# Patient Record
Sex: MALE | Race: WHITE | NOT HISPANIC OR LATINO | Employment: FULL TIME | URBAN - METROPOLITAN AREA
[De-identification: names, ages, dates, MRNs, and addresses within clinical notes are randomized per-mention and may not be internally consistent; named-entity substitution may affect disease eponyms.]

---

## 2017-03-16 ENCOUNTER — OFFICE VISIT (OUTPATIENT)
Dept: FAMILY MEDICINE | Facility: CLINIC | Age: 47
End: 2017-03-16
Payer: COMMERCIAL

## 2017-03-16 VITALS
SYSTOLIC BLOOD PRESSURE: 112 MMHG | DIASTOLIC BLOOD PRESSURE: 64 MMHG | HEIGHT: 73 IN | TEMPERATURE: 99.4 F | BODY MASS INDEX: 31.38 KG/M2 | WEIGHT: 236.8 LBS | HEART RATE: 86 BPM

## 2017-03-16 DIAGNOSIS — B34.9 VIRAL ILLNESS: ICD-10-CM

## 2017-03-16 DIAGNOSIS — R50.9 FEVER, UNSPECIFIED: Primary | ICD-10-CM

## 2017-03-16 LAB
BASOPHILS # BLD AUTO: 0 10E9/L (ref 0–0.2)
BASOPHILS NFR BLD AUTO: 0.2 %
DIFFERENTIAL METHOD BLD: ABNORMAL
EOSINOPHIL # BLD AUTO: 0 10E9/L (ref 0–0.7)
EOSINOPHIL NFR BLD AUTO: 0.2 %
ERYTHROCYTE [DISTWIDTH] IN BLOOD BY AUTOMATED COUNT: 12.3 % (ref 10–15)
FLUAV+FLUBV AG SPEC QL: NEGATIVE
FLUAV+FLUBV AG SPEC QL: NORMAL
HCT VFR BLD AUTO: 39.5 % (ref 40–53)
HGB BLD-MCNC: 13.4 G/DL (ref 13.3–17.7)
LYMPHOCYTES # BLD AUTO: 0.9 10E9/L (ref 0.8–5.3)
LYMPHOCYTES NFR BLD AUTO: 9.8 %
MCH RBC QN AUTO: 30.7 PG (ref 26.5–33)
MCHC RBC AUTO-ENTMCNC: 33.9 G/DL (ref 31.5–36.5)
MCV RBC AUTO: 91 FL (ref 78–100)
MONOCYTES # BLD AUTO: 1.7 10E9/L (ref 0–1.3)
MONOCYTES NFR BLD AUTO: 18 %
NEUTROPHILS # BLD AUTO: 6.6 10E9/L (ref 1.6–8.3)
NEUTROPHILS NFR BLD AUTO: 71.8 %
PLATELET # BLD AUTO: 237 10E9/L (ref 150–450)
RBC # BLD AUTO: 4.36 10E12/L (ref 4.4–5.9)
SPECIMEN SOURCE: NORMAL
WBC # BLD AUTO: 9.2 10E9/L (ref 4–11)

## 2017-03-16 PROCEDURE — 87804 INFLUENZA ASSAY W/OPTIC: CPT | Performed by: PHYSICIAN ASSISTANT

## 2017-03-16 PROCEDURE — 36415 COLL VENOUS BLD VENIPUNCTURE: CPT | Performed by: PHYSICIAN ASSISTANT

## 2017-03-16 PROCEDURE — 85025 COMPLETE CBC W/AUTO DIFF WBC: CPT | Performed by: PHYSICIAN ASSISTANT

## 2017-03-16 PROCEDURE — 99213 OFFICE O/P EST LOW 20 MIN: CPT | Performed by: PHYSICIAN ASSISTANT

## 2017-03-16 ASSESSMENT — ENCOUNTER SYMPTOMS
EYE PAIN: 0
NERVOUS/ANXIOUS: 0
LOSS OF CONSCIOUSNESS: 0
BLURRED VISION: 0
TINGLING: 0
CONSTIPATION: 0
SHORTNESS OF BREATH: 0
DYSURIA: 0
FOCAL WEAKNESS: 0
HEMOPTYSIS: 0
EYE DISCHARGE: 0
DOUBLE VISION: 0
COUGH: 0
WEIGHT LOSS: 0
ORTHOPNEA: 0
FREQUENCY: 0
HEADACHES: 1
BACK PAIN: 1
HEARTBURN: 0
PALPITATIONS: 0
NAUSEA: 0
DEPRESSION: 0
EYE REDNESS: 0
SPUTUM PRODUCTION: 0
SEIZURES: 0
BLOOD IN STOOL: 0
ABDOMINAL PAIN: 0
MYALGIAS: 1
WHEEZING: 0
FEVER: 1
WEAKNESS: 0
NECK PAIN: 0
SORE THROAT: 0
PHOTOPHOBIA: 0
SENSORY CHANGE: 0
DIAPHORESIS: 0
INSOMNIA: 0
VOMITING: 0
DIZZINESS: 0
CHILLS: 1
HALLUCINATIONS: 0
DIARRHEA: 0

## 2017-03-16 ASSESSMENT — ANXIETY QUESTIONNAIRES
3. WORRYING TOO MUCH ABOUT DIFFERENT THINGS: SEVERAL DAYS
1. FEELING NERVOUS, ANXIOUS, OR ON EDGE: SEVERAL DAYS
7. FEELING AFRAID AS IF SOMETHING AWFUL MIGHT HAPPEN: NOT AT ALL
GAD7 TOTAL SCORE: 4
6. BECOMING EASILY ANNOYED OR IRRITABLE: NOT AT ALL
5. BEING SO RESTLESS THAT IT IS HARD TO SIT STILL: NOT AT ALL
2. NOT BEING ABLE TO STOP OR CONTROL WORRYING: SEVERAL DAYS

## 2017-03-16 ASSESSMENT — LIFESTYLE VARIABLES: SUBSTANCE_ABUSE: 0

## 2017-03-16 ASSESSMENT — PATIENT HEALTH QUESTIONNAIRE - PHQ9: 5. POOR APPETITE OR OVEREATING: SEVERAL DAYS

## 2017-03-16 NOTE — PROGRESS NOTES
HPI     SUBJECTIVE:                                                    Ruiz Son is a 46 year old male who presents to clinic today for nearly 4 days history of body aches, fever, night sweats and fatigue. He has a slight cough.    ENT Symptoms             Symptoms: cc Present Absent Comment   Fever/Chills  x     Fatigue  x     Muscle Aches  x     Eye Irritation   x    Sneezing   x    Nasal Saurabh/Drg   x    Sinus Pressure/Pain   x Headaches   Loss of smell   x    Dental pain   x    Sore Throat   x    Swollen Glands   x    Ear Pain/Fullness   x    Cough  x  Some    Wheeze   x    Chest Pain   x    Shortness of breath   x    Rash       Other         Symptom duration:  Since Sunday    Symptom severity:     Treatments tried:     Contacts:  Co workers     Problem list and histories reviewed & adjusted, as indicated.  Additional history: as documented    Patient Active Problem List   Diagnosis     Essential hypertension     Generalized anxiety disorder     HYPERLIPIDEMIA LDL GOAL <130     Mild major depression (H)     Past Surgical History   Procedure Laterality Date     Surgical history of -   age 15     RIGHT varicocelectomy       Social History   Substance Use Topics     Smoking status: Never Smoker     Smokeless tobacco: Never Used     Alcohol use 0.0 oz/week     0 Standard drinks or equivalent per week      Comment: very little/   1-2 xmonth     Family History   Problem Relation Age of Onset     Hypertension Mother      CANCER Father      esophageal     Hypertension Brother      Prostate Cancer Other      paternal uncle     Cancer - colorectal No family hx of      C.A.D. No family hx of          Current Outpatient Prescriptions   Medication Sig Dispense Refill     testosterone (ANDROGEL 1% PUMP) 12.5 MG/ACT (1%) gel Place 4 pumps (50 mg) onto the skin daily Apply from dispenser to clean, dry, intact skin of the shoulders, upper arms, or abdomen. 1 Month 5     citalopram (CELEXA) 40 MG tablet Take 1 tablet (40 mg)  by mouth daily 90 tablet 3     atenolol (TENORMIN) 25 MG tablet Take 1 tablet (25 mg) by mouth 2 times daily 180 tablet 3     No Known Allergies  Labs reviewed in EPIC    Reviewed and updated as needed this visit by clinical staff  Tobacco  Allergies  Med Hx  Surg Hx  Fam Hx  Soc Hx      Reviewed and updated as needed this visit by Provider       Review of Systems   Constitutional: Positive for chills, fever and malaise/fatigue. Negative for diaphoresis and weight loss.   HENT: Negative for congestion, ear discharge, ear pain, hearing loss, nosebleeds and sore throat.    Eyes: Negative for blurred vision, double vision, photophobia, pain, discharge and redness.   Respiratory: Negative for cough, hemoptysis, sputum production, shortness of breath and wheezing.    Cardiovascular: Negative for chest pain, palpitations, orthopnea and leg swelling.   Gastrointestinal: Negative for abdominal pain, blood in stool, constipation, diarrhea, heartburn, melena, nausea and vomiting.   Genitourinary: Negative.  Negative for dysuria, frequency and urgency.   Musculoskeletal: Positive for back pain and myalgias. Negative for joint pain and neck pain.   Skin: Negative for itching and rash.   Neurological: Positive for headaches. Negative for dizziness, tingling, sensory change, focal weakness, seizures, loss of consciousness and weakness.   Endo/Heme/Allergies: Negative.    Psychiatric/Behavioral: Negative for depression, hallucinations, substance abuse and suicidal ideas. The patient is not nervous/anxious and does not have insomnia.          Physical Exam   Constitutional: He is oriented to person, place, and time and well-developed, well-nourished, and in no distress.   HENT:   Head: Normocephalic and atraumatic.   Right Ear: External ear normal.   Left Ear: External ear normal.   Nose: Nose normal.   Mouth/Throat: Oropharynx is clear and moist.   Eyes: Conjunctivae and EOM are normal. Pupils are equal, round, and reactive  to light. Right eye exhibits no discharge. Left eye exhibits no discharge. No scleral icterus.   Neck: Normal range of motion. Neck supple. No thyromegaly present.   Cardiovascular: Normal rate, regular rhythm, normal heart sounds and intact distal pulses.  Exam reveals no gallop and no friction rub.    No murmur heard.  Pulmonary/Chest: Effort normal and breath sounds normal. No respiratory distress. He has no wheezes. He has no rales. He exhibits no tenderness.   Abdominal: Soft. Bowel sounds are normal. He exhibits no distension and no mass. There is no tenderness. There is no rebound and no guarding.   Musculoskeletal: Normal range of motion. He exhibits no edema or tenderness.   Lymphadenopathy:     He has no cervical adenopathy.   Neurological: He is alert and oriented to person, place, and time. He has normal reflexes. No cranial nerve deficit. He exhibits normal muscle tone. Gait normal. Coordination normal.   Skin: Skin is warm and dry. No rash noted. No erythema.   Psychiatric: Mood, memory, affect and judgment normal.         (R50.9) Fever, unspecified  (primary encounter diagnosis)  Comment:   Plan: Influenza A/B antigen, CBC with platelets and         differential           (B34.9) Viral illness  Comment:   Plan:         Symptomatic measures and follow up if not improving

## 2017-03-16 NOTE — NURSING NOTE
"Chief Complaint   Patient presents with     Sweats     Generalized Body Aches       Initial /64 (BP Location: Right arm, Patient Position: Chair, Cuff Size: Adult Large)  Pulse 86  Temp 99.4  F (37.4  C) (Tympanic)  Ht 6' 1\" (1.854 m)  Wt 236 lb 12.8 oz (107.4 kg)  BMI 31.24 kg/m2 Estimated body mass index is 31.24 kg/(m^2) as calculated from the following:    Height as of this encounter: 6' 1\" (1.854 m).    Weight as of this encounter: 236 lb 12.8 oz (107.4 kg).  Medication Reconciliation: complete    Health Maintenance that is potentially due pending provider review:  NONE    n/a    Nan COTTO CMA    "

## 2017-03-16 NOTE — MR AVS SNAPSHOT
"              After Visit Summary   3/16/2017    Ruiz Son    MRN: [id number]           Patient Information     Date Of Birth          1970        Visit Information        Provider Department      3/16/2017 2:00 PM Lino Vincent PA-C [facility]        Today's Diagnoses     Fever, unspecified    -  1    Viral illness           Follow-ups after your visit        Follow-up notes from your care team     Return if symptoms worsen or fail to improve.      Who to contact     If you have questions or need follow up information about today's clinic visit or your schedule please contact [facility] directly at [phone].  Normal or non-critical lab and imaging results will be communicated to you by [employer], letter or phone within 4 business days after the clinic has received the results. If you do not hear from us within 7 days, please contact the clinic through [employer] or phone. If you have a critical or abnormal lab result, we will notify you by phone as soon as possible.  Submit refill requests through [employer] or call your pharmacy and they will forward the refill request to us. Please allow 3 business days for your refill to be completed.          Additional Information About Your Visit        MyChart Information     [employer] lets you send messages to your doctor, view your test results, renew your prescriptions, schedule appointments and more. To sign up, go to [email or web address] . Click on \"Log in\" on the left side of the screen, which will take you to the Welcome page. Then click on \"Sign up Now\" on the right side of the page.     You will be asked to enter the access code listed below, as well as some personal information. Please follow the directions to create your username and password.     Your access code is: [email or web address]  Expires: 2017  3:02 PM     Your access code will  in 90 days. If you need help or a new code, please call your [place] clinic " "or [phone].        Care EveryWhere ID     This is your Care EveryWhere ID. This could be used by other organizations to access your [place] medical records  [id number]        Your Vitals Were     Pulse Temperature Height BMI (Body Mass Index)          86 99.4  F (37.4  C) (Tympanic) 6' 1\" (1.854 m) 31.24 kg/m2         Blood Pressure from Last 3 Encounters:   03/16/17 112/64   09/21/16 134/84   06/15/16 131/88    Weight from Last 3 Encounters:   03/16/17 236 lb 12.8 oz (107.4 kg)   09/21/16 240 lb 9.6 oz (109.1 kg)   06/15/16 241 lb (109.3 kg)              We Performed the Following     CBC with platelets and differential     Influenza A/B antigen        Primary Care Provider Office Phone # Fax #    Lino Crouch [phone]       [facility] [address] [address]        Thank you!     Thank you for choosing [facility]  for your care. Our goal is always to provide you with excellent care. Hearing back from our patients is one way we can continue to improve our services. Please take a few minutes to complete the written survey that you may receive in the mail after your visit with us. Thank you!             Your Updated Medication List - Protect others around you: Learn how to safely use, store and throw away your medicines at www.disposemymeds.org.          This list is accurate as of: 3/16/17  3:02 PM.  Always use your most recent med list.                   Brand Name Dispense Instructions for use    atenolol 25 MG tablet    TENORMIN    180 tablet    Take 1 tablet (25 mg) by mouth 2 times daily       citalopram 40 MG tablet    celeXA    90 tablet    Take 1 tablet (40 mg) by mouth daily       testosterone 12.5 MG/ACT (1%) gel    ANDROGEL 1% PUMP    1 Month    Place 4 pumps (50 mg) onto the skin daily Apply from dispenser to clean, dry, intact skin of the shoulders, upper arms, or abdomen.         "

## 2017-03-17 ASSESSMENT — PATIENT HEALTH QUESTIONNAIRE - PHQ9: SUM OF ALL RESPONSES TO PHQ QUESTIONS 1-9: 4

## 2017-03-17 ASSESSMENT — ANXIETY QUESTIONNAIRES: GAD7 TOTAL SCORE: 4

## 2017-03-22 ENCOUNTER — TELEPHONE (OUTPATIENT)
Dept: NURSING | Facility: CLINIC | Age: 47
End: 2017-03-22

## 2017-03-22 ENCOUNTER — OFFICE VISIT (OUTPATIENT)
Dept: FAMILY MEDICINE | Facility: CLINIC | Age: 47
End: 2017-03-22
Payer: COMMERCIAL

## 2017-03-22 VITALS
SYSTOLIC BLOOD PRESSURE: 121 MMHG | BODY MASS INDEX: 30.48 KG/M2 | DIASTOLIC BLOOD PRESSURE: 78 MMHG | HEIGHT: 73 IN | WEIGHT: 230 LBS | TEMPERATURE: 98.4 F | HEART RATE: 103 BPM

## 2017-03-22 DIAGNOSIS — B00.1 COLD SORE: ICD-10-CM

## 2017-03-22 DIAGNOSIS — M25.50 MULTIPLE JOINT PAIN: ICD-10-CM

## 2017-03-22 DIAGNOSIS — R50.9 FEVER, UNSPECIFIED: Primary | ICD-10-CM

## 2017-03-22 LAB
ALBUMIN SERPL-MCNC: 2.6 G/DL (ref 3.4–5)
ALP SERPL-CCNC: 177 U/L (ref 40–150)
ALT SERPL W P-5'-P-CCNC: 55 U/L (ref 0–70)
ANION GAP SERPL CALCULATED.3IONS-SCNC: 8 MMOL/L (ref 3–14)
AST SERPL W P-5'-P-CCNC: 21 U/L (ref 0–45)
BILIRUB SERPL-MCNC: 0.6 MG/DL (ref 0.2–1.3)
BUN SERPL-MCNC: 13 MG/DL (ref 7–30)
CALCIUM SERPL-MCNC: 8.9 MG/DL (ref 8.5–10.1)
CHLORIDE SERPL-SCNC: 100 MMOL/L (ref 94–109)
CO2 SERPL-SCNC: 28 MMOL/L (ref 20–32)
CREAT SERPL-MCNC: 1.03 MG/DL (ref 0.66–1.25)
CRP SERPL-MCNC: 211 MG/L (ref 0–8)
ERYTHROCYTE [DISTWIDTH] IN BLOOD BY AUTOMATED COUNT: 13.1 % (ref 10–15)
ERYTHROCYTE [SEDIMENTATION RATE] IN BLOOD BY WESTERGREN METHOD: 75 MM/H (ref 0–15)
GFR SERPL CREATININE-BSD FRML MDRD: 77 ML/MIN/1.7M2
GLUCOSE SERPL-MCNC: 83 MG/DL (ref 70–99)
HCT VFR BLD AUTO: 38.5 % (ref 40–53)
HGB BLD-MCNC: 13.4 G/DL (ref 13.3–17.7)
MCH RBC QN AUTO: 31.8 PG (ref 26.5–33)
MCHC RBC AUTO-ENTMCNC: 34.8 G/DL (ref 31.5–36.5)
MCV RBC AUTO: 91 FL (ref 78–100)
PLATELET # BLD AUTO: 381 10E9/L (ref 150–450)
POTASSIUM SERPL-SCNC: 3.2 MMOL/L (ref 3.4–5.3)
PROT SERPL-MCNC: 7.3 G/DL (ref 6.8–8.8)
RBC # BLD AUTO: 4.22 10E12/L (ref 4.4–5.9)
SODIUM SERPL-SCNC: 136 MMOL/L (ref 133–144)
URATE SERPL-MCNC: 3.2 MG/DL (ref 3.5–7.2)
WBC # BLD AUTO: 8.4 10E9/L (ref 4–11)

## 2017-03-22 PROCEDURE — 85027 COMPLETE CBC AUTOMATED: CPT | Performed by: NURSE PRACTITIONER

## 2017-03-22 PROCEDURE — 80053 COMPREHEN METABOLIC PANEL: CPT | Performed by: NURSE PRACTITIONER

## 2017-03-22 PROCEDURE — 86140 C-REACTIVE PROTEIN: CPT | Performed by: NURSE PRACTITIONER

## 2017-03-22 PROCEDURE — 36415 COLL VENOUS BLD VENIPUNCTURE: CPT | Performed by: NURSE PRACTITIONER

## 2017-03-22 PROCEDURE — 99213 OFFICE O/P EST LOW 20 MIN: CPT | Performed by: NURSE PRACTITIONER

## 2017-03-22 PROCEDURE — 84550 ASSAY OF BLOOD/URIC ACID: CPT | Performed by: NURSE PRACTITIONER

## 2017-03-22 PROCEDURE — 85652 RBC SED RATE AUTOMATED: CPT | Performed by: NURSE PRACTITIONER

## 2017-03-22 PROCEDURE — 86618 LYME DISEASE ANTIBODY: CPT | Performed by: NURSE PRACTITIONER

## 2017-03-22 RX ORDER — VALACYCLOVIR HYDROCHLORIDE 1 G/1
2000 TABLET, FILM COATED ORAL 2 TIMES DAILY
Qty: 4 TABLET | Refills: 0 | Status: SHIPPED | OUTPATIENT
Start: 2017-03-22 | End: 2017-05-02

## 2017-03-22 NOTE — MR AVS SNAPSHOT
After Visit Summary   3/22/2017    Ruiz Son    MRN: [id number]           Patient Information     Date Of Birth          1970        Visit Information        Provider Department      3/22/2017 11:20 AM Addis Vazquez APRN CNP [facility]        Today's Diagnoses     Fever, unspecified    -  1    Multiple joint pain        Cold sore          Care Instructions          Thank you for choosing [facility] ([facility]  You may be receiving a survey in the mail from [facility] regarding your visit today.  Please take a few minutes to complete and return the survey to let us know how we are doing.      If you have questions or concerns, please contact us via [employer] or you can contact your care team at [phone].    Our Clinic hours are:  Monday 6:40 am  to 7:00 pm  Tuesday -Friday 6:40 am to 5:00 pm    The [place] outpatient lab hours are:  Monday - Friday 6:10 am to 4:45 pm  Saturdays 7:00 am to 11:00 am  Appointments are required, call [phone]    If you have clinical questions after hours or would like to schedule an appointment,  call the clinic at [phone].          Follow-ups after your visit        Your next 10 appointments already scheduled     Mar 27, 2017  2:00 PM CDT   Office Visit with Cherelle Masters MD   [facility]    [address] [address] [phone]           Bring a current list of meds and any records pertaining to this visit.  For Physicals, please bring immunization records and any forms needing to be filled out.  Please arrive 10 minutes early to complete paperwork.              Who to contact     If you have questions or need follow up information about today's clinic visit or your schedule please contact [facility] directly at [phone].  Normal or non-critical lab and imaging results will be communicated to you by MyChart, letter or phone within  "4 business days after the clinic has received the results. If you do not hear from us within 7 days, please contact the clinic through [employer] or phone. If you have a critical or abnormal lab result, we will notify you by phone as soon as possible.  Submit refill requests through [employer] or call your pharmacy and they will forward the refill request to us. Please allow 3 business days for your refill to be completed.          Additional Information About Your Visit        [employer] Information     [employer] lets you send messages to your doctor, view your test results, renew your prescriptions, schedule appointments and more. To sign up, go to [email or web address] . Click on \"Log in\" on the left side of the screen, which will take you to the Welcome page. Then click on \"Sign up Now\" on the right side of the page.     You will be asked to enter the access code listed below, as well as some personal information. Please follow the directions to create your username and password.     Your access code is: [email or web address]  Expires: 2017  3:02 PM     Your access code will  in 90 days. If you need help or a new code, please call your [place] clinic or [phone].        Care EveryWhere ID     This is your Care EveryWhere ID. This could be used by other organizations to access your [place] medical records  [id number]        Your Vitals Were     Pulse Temperature Height BMI (Body Mass Index)          103 98.4  F (36.9  C) (Oral) 6' 1\" (1.854 m) 30.34 kg/m2         Blood Pressure from Last 3 Encounters:   17 121/78   17 112/64   16 134/84    Weight from Last 3 Encounters:   17 230 lb (104.3 kg)   17 236 lb 12.8 oz (107.4 kg)   16 240 lb 9.6 oz (109.1 kg)              We Performed the Following     CBC with platelets     Comprehensive metabolic panel     CRP inflammation     Erythrocyte sedimentation rate auto     Lyme Disease [place] with reflex to WB Serum     Uric acid        "   Today's Medication Changes          These changes are accurate as of: 3/22/17 11:59 PM.  If you have any questions, ask your nurse or doctor.               Start taking these medicines.        Dose/Directions    valACYclovir 1000 mg tablet   Commonly known as:  VALTREX   Used for:  Cold sore   Started by:  Addis Vazquez APRN CNP        Dose:  2000 mg   Take 2 tablets (2,000 mg) by mouth 2 times daily   Quantity:  4 tablet   Refills:  0            Where to get your medicines      These medications were sent to [facility] [address] [facility] [address] [facility] [address]  [address] [address]     Phone:  [phone]     valACYclovir 1000 mg tablet                Primary Care Provider Office Phone # Fax #    Lino Crouch [phone]       [facility] [address] [address]        Thank you!     Thank you for choosing [facility]  for your care. Our goal is always to provide you with excellent care. Hearing back from our patients is one way we can continue to improve our services. Please take a few minutes to complete the written survey that you may receive in the mail after your visit with us. Thank you!             Your Updated Medication List - Protect others around you: Learn how to safely use, store and throw away your medicines at www.disposemymeds.org.          This list is accurate as of: 3/22/17 11:59 PM.  Always use your most recent med list.                   Brand Name Dispense Instructions for use    atenolol 25 MG tablet    TENORMIN    180 tablet    Take 1 tablet (25 mg) by mouth 2 times daily       citalopram 40 MG tablet    celeXA    90 tablet    Take 1 tablet (40 mg) by mouth daily       testosterone 12.5 MG/ACT (1%) gel    ANDROGEL 1% PUMP    1 Month    Place 4 pumps (50 mg) onto the skin daily Apply from dispenser to clean, dry, intact skin of the shoulders, upper  arms, or abdomen.       valACYclovir 1000 mg tablet    VALTREX    4 tablet    Take 2 tablets (2,000 mg) by mouth 2 times daily

## 2017-03-22 NOTE — NURSING NOTE
"Chief Complaint   Patient presents with     Viral Syndrome       Initial /78 (BP Location: Right arm, Cuff Size: Adult Large)  Pulse 103  Temp 98.4  F (36.9  C) (Oral)  Ht 6' 1\" (1.854 m)  Wt 230 lb (104.3 kg)  BMI 30.34 kg/m2 Estimated body mass index is 30.34 kg/(m^2) as calculated from the following:    Height as of this encounter: 6' 1\" (1.854 m).    Weight as of this encounter: 230 lb (104.3 kg).  Medication Reconciliation: complete  "

## 2017-03-22 NOTE — TELEPHONE ENCOUNTER
"Call Type: Triage Call    Presenting Problem: Ruiz was diagnosed with flu like virus.  Today,  Ruiz is having chills and slow moving and body aches.   Ruiz has  many  \"cold sores on bottom lips.\"  [facility] Triage/Cold  Sores/disposition is to be seen within 24 hours and Seamus agreed.    Triage Note:  Guideline Title: Cold Sores  Recommended Disposition: See Provider within 24 hours  Original Inclination: Wanted to speak with a nurse  Override Disposition:  Intended Action: Call PCP/HCP  Physician Contacted: No  Severe pain AND unresponsive to 24 hours of home care ?  YES  Eye involvement ? NO  Symptoms limit fluid intake AND signs of dehydration ? NO  Other mouth lesions ? NO  Involvement of tip of nose ? NO  Symptoms limit fluid intake WITHOUT signs of dehydration ? NO  Signs or symptoms of cold sore(s) in an immunocompromised individual OR frail  elderly person ? NO  Any new OR worsening signs and symptoms of soft tissue infection ? NO  Involvement of face ? NO  Physician Instructions:  Care Advice: Try bland liquids - no citrus juices or carbonated beverages.  Avoid spicy, salty, or acidic foods.  Use straw to avoid lip, tongue and gum lesions.  Avoid contact with infants or anyone who has dermatitis or is  immunosuppressed (such as diabetes, HIV/AIDS, renal disease, chemotherapy,  organ transplant, or chronic steroid use).  Do not scratch or rub lesions to decrease the chance of secondary  infection.  INFECTION CONTROL  CAUTIONS  SYMPTOM / CONDITION MANAGEMENT  Apply ice chip to ulceration for 10 to 15 minutes three or four times a day  or suck on ice chip to reduce pain.  Consider application of local drying or soothing agent (e.g., Blistex,  Debrox, Abreva) to the area every two hours while awake or as directed on  the package or by pharmacist.  Avoid close physical contact (e.g., kissing, oral sex) with other people  until blisters heal. Avoid sexual contact until treatment completed.  Practice safe sex at all " times because sometimes the virus can be spread  even when you do not have an active sore.  Total water intake includes drinking water, water in beverages, and water  contained in food. Fluids make up about 80% of the body's total hydration  need. Individual fluid requirement to maintain hydration vary based on  physical activity, environmental factors and illness. Limit fluids that  contain sugar, caffeine, or alcohol. Urine will be very light yellow color  when you drink enough fluids.  Analgesic/Antipyretic Advice - NSAIDs: Consider aspirin, ibuprofen,  naproxen or ketoprofen for pain or fever as directed on label or by  pharmacist/provider. PRECAUTIONS: - You should not take this medicine for  more than 10 days unless recommended by your provider. EXCEPTIONS: - Should  not be used if taking blood thinners or have bleeding problems. - Do not  use if have history of sensitivity/allergy to any of these medications  or history of cardiovascular, ulcer, kidney, liver disease or diabetes  unless approved by provider. - Do not exceed recommended dose or frequency.  Analgesic/Antipyretic Advice - Acetaminophen: Consider acetaminophen as  directed on label or by pharmacist/provider for pain or fever. PRECAUTIONS:  - Use if there is no history of liver disease, alcoholism, or intake of  three or more alcohol drinks per day - Only if approved by provider during  pregnancy or when breastfeeding - Do not exceed recommended dose or  frequency. Do not take more than 3000 milligrams (mg) in 24 hours. Do not  take this medicine for more than 10 days unless recommended by your  provider. - During pregnancy, acetaminophen should not be taken more than 3  consecutive days without telling provider - To make sure you don't take too  much, check other medicines you take to see if they also contain  acetaminophen.  See provider within 8 hours if signs of infection occur (such as increased  tenderness, swelling, yellowish-green  drainage, or temperature of 101.5 F  [38.6 C] or greater OR any temperature in geriatric or immunocompromised  patients [such as diabetes, HIV/AIDS, renal disease, chemotherapy, organ  transplant, or chronic steroid use]).

## 2017-03-22 NOTE — PROGRESS NOTES
"  SUBJECTIVE:                                                    Ruiz Son is a 46 year old male who presents to clinic today for the following health issues:      ENT Symptoms             Symptoms: cc Present Absent Comment   Fever/Chills x x  Hasn't actually taken temp; but has hot and cold spells   Fatigue x x     Muscle Aches x x  Achy joints,  Swollen ankles and right hand. 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  No known tick bites          Problem list and histories reviewed & adjusted, as indicated.  Additional history: as documented      Reviewed and updated as needed this visit by clinical staff       Reviewed and updated as needed this visit by Provider         ROS:  Constitutional, HEENT, cardiovascular, pulmonary, gi and gu systems are negative, except as otherwise noted.    OBJECTIVE:                                                    /78 (BP Location: Right arm, Cuff Size: Adult Large)  Pulse 103  Temp 98.4  F (36.9  C) (Oral)  Ht 6' 1\" (1.854 m)  Wt 230 lb (104.3 kg)  BMI 30.34 kg/m2  Body mass index is 30.34 kg/(m^2).  GENERAL: healthy, alert and no distress  HENT: ear canals and TM's normal, nose and mouth without ulcers or lesions. Multiple cold sores along the bottom lip.  NECK: no adenopathy, no asymmetry, masses, or scars and thyroid normal to palpation  RESP: lungs clear to auscultation - no rales, rhonchi or wheezes  CV: regular " rate and rhythm, normal S1 S2, no S3 or S4, no murmur, click or rub, no peripheral edema and peripheral pulses strong  MS: +1 edema over the back of the right hand, trace edema bilateral ankles. No erythema. ROM normal. No tenderness with palpation of joints.    Diagnostic Test Results:  Results for orders placed or performed in visit on 03/22/17   CBC with platelets   Result Value Ref Range    WBC 8.4 4.0 - 11.0 10e9/L    RBC Count 4.22 (L) 4.4 - 5.9 10e12/L    Hemoglobin 13.4 13.3 - 17.7 g/dL    Hematocrit 38.5 (L) 40.0 - 53.0 %    MCV 91 78 - 100 fl    MCH 31.8 26.5 - 33.0 pg    MCHC 34.8 31.5 - 36.5 g/dL    RDW 13.1 10.0 - 15.0 %    Platelet Count 381 150 - 450 10e9/L   Comprehensive metabolic panel   Result Value Ref Range    Sodium 136 133 - 144 mmol/L    Potassium 3.2 (L) 3.4 - 5.3 mmol/L    Chloride 100 94 - 109 mmol/L    Carbon Dioxide 28 20 - 32 mmol/L    Anion Gap 8 3 - 14 mmol/L    Glucose 83 70 - 99 mg/dL    Urea Nitrogen 13 7 - 30 mg/dL    Creatinine 1.03 0.66 - 1.25 mg/dL    GFR Estimate 77 >60 mL/min/1.7m2    GFR Estimate If Black >90   GFR Calc   >60 mL/min/1.7m2    Calcium 8.9 8.5 - 10.1 mg/dL    Bilirubin Total 0.6 0.2 - 1.3 mg/dL    Albumin 2.6 (L) 3.4 - 5.0 g/dL    Protein Total 7.3 6.8 - 8.8 g/dL    Alkaline Phosphatase 177 (H) 40 - 150 U/L    ALT 55 0 - 70 U/L    AST 21 0 - 45 U/L   CRP inflammation   Result Value Ref Range    CRP Inflammation 211.0 (H) 0.0 - 8.0 mg/L   Erythrocyte sedimentation rate auto   Result Value Ref Range    Sed Rate 75 (H) 0 - 15 mm/h   Uric acid   Result Value Ref Range    Uric Acid 3.2 (L) 3.5 - 7.2 mg/dL   Lyme Disease [place] with reflex to WB Serum   Result Value Ref Range    Lyme Disease Antibodies Serum  0.00 - 0.89     <0.01  Negative, Absence of detectable Borrelia burdorferi antibodies. A negative   result does not exclude the possibility of Borrelia burgdorferi infection. If   early Lyme disease is suspected, a second sample should be collected  and tested   2 to 4 weeks later.          ASSESSMENT/PLAN:                                                        ICD-10-CM    1. Fever, unspecified R50.9 CBC with platelets     Comprehensive metabolic panel     CRP inflammation     Erythrocyte sedimentation rate auto     Uric acid     Lyme Disease [place] with reflex to WB Serum   2. Multiple joint pain M25.50 CBC with platelets     Comprehensive metabolic panel     CRP inflammation     Erythrocyte sedimentation rate auto     Uric acid     Lyme Disease [place] with reflex to WB Serum   3. Cold sore B00.1 valACYclovir (VALTREX) 1000 mg tablet     Labs today normal.  Likely viral syndrome.  Follow up next week if not improving.      The risks, benefits and treatment options of prescribed medications or other treatments have been discussed with the patient. The patient verbalized their understanding and should call or follow up if no improvement or if they develop further problems.    KEILY Taylor [facility]

## 2017-03-22 NOTE — PATIENT INSTRUCTIONS
Thank you for choosing [facility].  You may be receiving a survey in the mail from [employer] regarding your visit today.  Please take a few minutes to complete and return the survey to let us know how we are doing.      If you have questions or concerns, please contact us via [employer] or you can contact your care team at [phone].    Our Clinic hours are:  Monday 6:40 am  to 7:00 pm  Tuesday -Friday 6:40 am to 5:00 pm    The [place] outpatient lab hours are:  Monday - Friday 6:10 am to 4:45 pm  Saturdays 7:00 am to 11:00 am  Appointments are required, call [phone]    If you have clinical questions after hours or would like to schedule an appointment,  call the clinic at [phone].

## 2017-03-23 LAB — B BURGDOR IGG+IGM SER QL: NORMAL (ref 0–0.89)

## 2017-03-27 ENCOUNTER — TELEPHONE (OUTPATIENT)
Dept: FAMILY MEDICINE | Facility: CLINIC | Age: 47
End: 2017-03-27

## 2017-03-27 ENCOUNTER — OFFICE VISIT (OUTPATIENT)
Dept: FAMILY MEDICINE | Facility: CLINIC | Age: 47
End: 2017-03-27
Payer: COMMERCIAL

## 2017-03-27 VITALS
WEIGHT: 231 LBS | HEART RATE: 83 BPM | TEMPERATURE: 97.3 F | BODY MASS INDEX: 30.62 KG/M2 | HEIGHT: 73 IN | DIASTOLIC BLOOD PRESSURE: 65 MMHG | SYSTOLIC BLOOD PRESSURE: 110 MMHG

## 2017-03-27 DIAGNOSIS — M25.50 PAIN IN JOINT, MULTIPLE SITES: Primary | ICD-10-CM

## 2017-03-27 LAB
CRP SERPL-MCNC: 153 MG/L (ref 0–8)
ERYTHROCYTE [SEDIMENTATION RATE] IN BLOOD BY WESTERGREN METHOD: 69 MM/H (ref 0–15)
HETEROPH AB SER QL: NEGATIVE

## 2017-03-27 PROCEDURE — 86140 C-REACTIVE PROTEIN: CPT | Performed by: FAMILY MEDICINE

## 2017-03-27 PROCEDURE — 85652 RBC SED RATE AUTOMATED: CPT | Performed by: FAMILY MEDICINE

## 2017-03-27 PROCEDURE — 36415 COLL VENOUS BLD VENIPUNCTURE: CPT | Performed by: FAMILY MEDICINE

## 2017-03-27 PROCEDURE — 99213 OFFICE O/P EST LOW 20 MIN: CPT | Performed by: FAMILY MEDICINE

## 2017-03-27 PROCEDURE — 86308 HETEROPHILE ANTIBODY SCREEN: CPT | Performed by: FAMILY MEDICINE

## 2017-03-27 PROCEDURE — 86431 RHEUMATOID FACTOR QUANT: CPT | Performed by: FAMILY MEDICINE

## 2017-03-27 RX ORDER — MELOXICAM 7.5 MG/1
7.5 TABLET ORAL DAILY
Qty: 30 TABLET | Refills: 1 | Status: SHIPPED | OUTPATIENT
Start: 2017-03-27 | End: 2017-05-02

## 2017-03-27 NOTE — PATIENT INSTRUCTIONS
Thank you for choosing [facility].  You may be receiving a survey in the mail from [employer] regarding your visit today.  Please take a few minutes to complete and return the survey to let us know how we are doing.      If you have questions or concerns, please contact us via [employer] or you can contact your care team at [phone].    Our Clinic hours are:  Monday 6:40 am  to 7:00 pm  Tuesday -Friday 6:40 am to 5:00 pm    The [place] outpatient lab hours are:  Monday - Friday 6:10 am to 4:45 pm  Saturdays 7:00 am to 11:00 am  Appointments are required, call [phone]    If you have clinical questions after hours or would like to schedule an appointment,  call the clinic at [phone].

## 2017-03-27 NOTE — TELEPHONE ENCOUNTER
Reason for Call:  Other med question    Detailed comments: wife is calling asking if patient should hold of on taking the Mobic until after Rheumatology appointment tomorrow.    Phone Number Patient can be reached at: Other phone number:  [phone]    Best Time: any    Can we leave a detailed message on this number? YES    Call taken on 3/27/2017 at 4:01 PM by Donna Connor

## 2017-03-27 NOTE — MR AVS SNAPSHOT
After Visit Summary   3/27/2017    Ruiz Son    MRN: [id number]           Patient Information     Date Of Birth          1970        Visit Information        Provider Department      3/27/2017 2:00 PM Cherelle Masters MD [facility]        Today's Diagnoses     Pain in joint, multiple sites    -  1      Care Instructions          Thank you for choosing [facility].  You may be receiving a survey in the mail from [facility] regarding your visit today.  Please take a few minutes to complete and return the survey to let us know how we are doing.      If you have questions or concerns, please contact us via [employer] or you can contact your care team at [phone].    Our Clinic hours are:  Monday 6:40 am  to 7:00 pm  Tuesday -Friday 6:40 am to 5:00 pm    The [place] outpatient lab hours are:  Monday - Friday 6:10 am to 4:45 pm  Saturdays 7:00 am to 11:00 am  Appointments are required, call [phone]    If you have clinical questions after hours or would like to schedule an appointment,  call the clinic at [phone].        Follow-ups after your visit        Additional Services     RHEUMATOLOGY REFERRAL       Your provider has referred you to: [facility]: [facility] [phone]   [email or web address]    Please be aware that coverage of these services is subject to the terms and limitations of your health insurance plan.  Call member services at your health plan with any benefit or coverage questions.      Please bring the following with you to your appointment:    (1) Any X-Rays, CTs or MRIs which have been performed.  Contact the facility where they were done to arrange for  prior to your scheduled appointment.    (2) List of current medications   (3) This referral request   (4) Any documents/labs given to you for this referral                  Who to contact     If you have questions or need follow up  "information about today's clinic visit or your schedule please contact [facility] directly at [phone].  Normal or non-critical lab and imaging results will be communicated to you by panOpenhart, letter or phone within 4 business days after the clinic has received the results. If you do not hear from us within 7 days, please contact the clinic through panOpenhart or phone. If you have a critical or abnormal lab result, we will notify you by phone as soon as possible.  Submit refill requests through [employer] or call your pharmacy and they will forward the refill request to us. Please allow 3 business days for your refill to be completed.          Additional Information About Your Visit        panOpenhart Information     Phrazit lets you send messages to your doctor, view your test results, renew your prescriptions, schedule appointments and more. To sign up, go to [email or web address] . Click on \"Log in\" on the left side of the screen, which will take you to the Welcome page. Then click on \"Sign up Now\" on the right side of the page.     You will be asked to enter the access code listed below, as well as some personal information. Please follow the directions to create your username and password.     Your access code is: [email or web address]  Expires: 2017  3:02 PM     Your access code will  in 90 days. If you need help or a new code, please call your [place] clinic or [phone].        Care EveryWhere ID     This is your Care EveryWhere ID. This could be used by other organizations to access your [place] medical records  [id number]        Your Vitals Were     Pulse Temperature Height BMI (Body Mass Index)          83 97.3  F (36.3  C) (Tympanic) 6' 1\" (1.854 m) 30.48 kg/m2         Blood Pressure from Last 3 Encounters:   17 110/65   17 121/78   17 112/64    Weight from Last 3 Encounters:   17 231 lb (104.8 kg)   17 230 lb (104.3 kg)   17 236 lb 12.8 oz (107.4 kg) "              We Performed the Following     CRP, inflammation     ESR: Erythrocyte sedimentation rate     Mononucleosis screen     Rheumatoid factor     RHEUMATOLOGY REFERRAL          Today's Medication Changes          These changes are accurate as of: 3/27/17  2:33 PM.  If you have any questions, ask your nurse or doctor.               Start taking these medicines.        Dose/Directions    meloxicam 7.5 MG tablet   Commonly known as:  MOBIC   Used for:  Pain in joint, multiple sites   Started by:  Cherelle Masters MD        Dose:  7.5 mg   Take 1 tablet (7.5 mg) by mouth daily   Quantity:  30 tablet   Refills:  1            Where to get your medicines      These medications were sent to [facility] [id number] [place] [address] - [address] AT [facility] [address]  [address] [facility] [address]     Phone:  [phone]     meloxicam 7.5 MG tablet                Primary Care Provider Office Phone # Fax #    Lino Crouch [phone]       [facility] [address] [facility] [address]        Thank you!     Thank you for choosing [facility]  for your care. Our goal is always to provide you with excellent care. Hearing back from our patients is one way we can continue to improve our services. Please take a few minutes to complete the written survey that you may receive in the mail after your visit with us. Thank you!             Your Updated Medication List - Protect others around you: Learn how to safely use, store and throw away your medicines at www.disposemymeds.org.          This list is accurate as of: 3/27/17  2:33 PM.  Always use your most recent med list.                   Brand Name Dispense Instructions for use    atenolol 25 MG tablet    TENORMIN    180 tablet    Take 1 tablet (25 mg) by mouth 2 times daily       citalopram 40 MG tablet    celeXA    90 tablet    Take 1 tablet (40 mg) by mouth daily       meloxicam  7.5 MG tablet    MOBIC    30 tablet    Take 1 tablet (7.5 mg) by mouth daily       testosterone 12.5 MG/ACT (1%) gel    ANDROGEL 1% PUMP    1 Month    Place 4 pumps (50 mg) onto the skin daily Apply from dispenser to clean, dry, intact skin of the shoulders, upper arms, or abdomen.       valACYclovir 1000 mg tablet    VALTREX    4 tablet    Take 2 tablets (2,000 mg) by mouth 2 times daily

## 2017-03-27 NOTE — TELEPHONE ENCOUNTER
Patient reported that he did take Advil this evening.  Advised patient to hold off on starting the mobic until he discusses the medications with the rheumatologist.  Spouse and wife agree with the plan.    Loree DONALDSON RN

## 2017-03-28 ENCOUNTER — OFFICE VISIT (OUTPATIENT)
Dept: RHEUMATOLOGY | Facility: CLINIC | Age: 47
End: 2017-03-28
Payer: COMMERCIAL

## 2017-03-28 ENCOUNTER — RADIANT APPOINTMENT (OUTPATIENT)
Dept: GENERAL RADIOLOGY | Facility: CLINIC | Age: 47
End: 2017-03-28
Attending: INTERNAL MEDICINE
Payer: COMMERCIAL

## 2017-03-28 VITALS
HEART RATE: 89 BPM | DIASTOLIC BLOOD PRESSURE: 67 MMHG | SYSTOLIC BLOOD PRESSURE: 112 MMHG | BODY MASS INDEX: 31.14 KG/M2 | WEIGHT: 236 LBS | TEMPERATURE: 98.9 F

## 2017-03-28 DIAGNOSIS — M25.50 PAIN IN JOINT, MULTIPLE SITES: ICD-10-CM

## 2017-03-28 DIAGNOSIS — M25.50 PAIN IN JOINT, MULTIPLE SITES: Primary | ICD-10-CM

## 2017-03-28 LAB
CK SERPL-CCNC: 43 U/L (ref 30–300)
RHEUMATOID FACT SER NEPH-ACNC: <20 IU/ML (ref 0–20)

## 2017-03-28 PROCEDURE — 82164 ANGIOTENSIN I ENZYME TEST: CPT | Mod: 90 | Performed by: INTERNAL MEDICINE

## 2017-03-28 PROCEDURE — 84165 PROTEIN E-PHORESIS SERUM: CPT | Performed by: INTERNAL MEDICINE

## 2017-03-28 PROCEDURE — 86038 ANTINUCLEAR ANTIBODIES: CPT | Performed by: INTERNAL MEDICINE

## 2017-03-28 PROCEDURE — 99244 OFF/OP CNSLTJ NEW/EST MOD 40: CPT | Performed by: INTERNAL MEDICINE

## 2017-03-28 PROCEDURE — 00000402 ZZHCL STATISTIC TOTAL PROTEIN: Performed by: INTERNAL MEDICINE

## 2017-03-28 PROCEDURE — 36415 COLL VENOUS BLD VENIPUNCTURE: CPT | Performed by: INTERNAL MEDICINE

## 2017-03-28 PROCEDURE — 86200 CCP ANTIBODY: CPT | Performed by: INTERNAL MEDICINE

## 2017-03-28 PROCEDURE — 99000 SPECIMEN HANDLING OFFICE-LAB: CPT | Performed by: INTERNAL MEDICINE

## 2017-03-28 PROCEDURE — 82550 ASSAY OF CK (CPK): CPT | Performed by: INTERNAL MEDICINE

## 2017-03-28 PROCEDURE — 71020 XR CHEST 2 VW: CPT

## 2017-03-28 RX ORDER — PREDNISONE 10 MG/1
TABLET ORAL
Qty: 70 TABLET | Refills: 1 | Status: SHIPPED | OUTPATIENT
Start: 2017-03-28 | End: 2017-05-02

## 2017-03-28 NOTE — NURSING NOTE
"Chief Complaint   Patient presents with     Consult     Dr. Rowan   Joint pain all over and started like flu symptoms. Swelling and chills.        Initial /67  Pulse 89  Temp 98.9  F (37.2  C)  Wt 107 kg (236 lb)  BMI 31.14 kg/m2 Estimated body mass index is 31.14 kg/(m^2) as calculated from the following:    Height as of 3/27/17: 1.854 m (6' 1\").    Weight as of this encounter: 107 kg (236 lb).      Patient prefers to be contacted via at Home.   Okay to leave detailed message: Yes  Patient uses MyChart: Yes    Danitza LEE LPN    "

## 2017-03-29 LAB
ALBUMIN SERPL ELPH-MCNC: 2.7 G/DL (ref 3.7–5.1)
ALPHA1 GLOB SERPL ELPH-MCNC: 0.7 G/DL (ref 0.2–0.4)
ALPHA2 GLOB SERPL ELPH-MCNC: 1.2 G/DL (ref 0.5–0.9)
ANA SER QL IA: NORMAL
B-GLOBULIN SERPL ELPH-MCNC: 0.8 G/DL (ref 0.6–1)
CCP AB SER IA-ACNC: 1 U/ML
GAMMA GLOB SERPL ELPH-MCNC: 0.8 G/DL (ref 0.7–1.6)
M PROTEIN SERPL ELPH-MCNC: 0 G/DL
PROT PATTERN SERPL ELPH-IMP: ABNORMAL

## 2017-03-29 NOTE — PROGRESS NOTES
REFERRING PHYSICIAN:  Dr. Cherelle Masters.       CHIEF COMPLAINT:  Joint pain.       HISTORY:  Mr. Ruiz Son is a 46-year-old male whom I am asked to reconsult for on joint pain.  This apparently started basically overnight approximately 2 weeks ago.  He started developing night sweats, fever and then suddenly had swelling involving his ankles and then his hands and elbows.  Because of this, he was seen and evaluated on 03/22/2017 and found to have a uric acid level of 3.2, Lyme negative, sed rate of 75, CRP of 211.  Was seen again yesterday, sed rate now 69, CRP of 153, rheumatoid factor is negative.        He has noticed some discoloration by his ankles, but there has not been any other type of rash.  He does not have psoriasis.  He has not had acute or chronic diarrhea, nor does he carry the diagnosis of inflammatory bowel disease.  He has not noticed any unexplained lymphadenopathy.  There is no stomatitis.  He is not having any dysuria or hematuria.  He denies any progressive dyspnea or acute or chronic cough, hemoptysis, etc.  He was given a prescription for Meloxicam yesterday, which he has not taken.  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        PAST MEDICAL HISTORY:  Hyperlipidemia, hypertension, depression.       MEDICATIONS:    1.  Valtrex.   2.  Testosterone gel.    3.  Citalopram 40 mg daily.    4.  Tenormin 25 mg b.i.d.       DRUG ALLERGIES:  None.        SOCIAL HISTORY:  Is not a smoker, drinks occasionally.       FAMILY HISTORY:  There is nobody in his family with rheumatoid arthritis, lupus, etc.       REVIEW OF SYSTEMS:  A 10-point review of systems is otherwise negative.       PHYSICAL EXAMINATION:     VITAL SIGNS:  Blood pressure 112/67, pulse 89, temperature 98.9, weight 236.     HEENT:  He is normocephalic and atraumatic.  Sclerae are clear and moist.  Oropharynx without lesions.    NECK:  Supple without lymphadenopathy.  There is no palpable enlargement  of the salivary glands or thyroid.     LUNGS:  Clear to auscultation bilaterally.    HEART:  Regular rate and rhythm without murmur.    JOINT EXAM:  There is erythema of warmth, tenderness and perhaps some mild to moderate synovitis of both wrists.  This is also true of the bilateral second, third MCP joints.  There is fullness of the right elbow.  Flexion extension of the elbows was normal.  He has pain with range of motion of both wrists, although flexion extension appears to be fairly close to normal.  No pain or fusion, synovitis of the shoulders.  Range of motion of the hips is normal.  There is no synovitis or fusion of the knees.  There is peripheral edema present in the ankles, there is tenderness diffusely about the ankles, difficult to assess if there is truly synovitis or not.  There is also tenderness of the bilateral 2, 3 MTP joints.    SKIN:  There is some hyperemia present on the medial aspects of both ankles, but no other rashes are noted.    STRENGTH:  Diffuse weakness, but otherwise is intact.       IMPRESSION:  Inflammatory arthritis, etiologies conclude a reactive arthritis versus an JEMIMA associated disorder versus early rheumatoid arthritis versus sarcoid.        RECOMMENDATIONS:    1.  Chest a chest x-ray to rule out sarcoid.     2.  Check an JEMIMA and if positive, check an TITO panel, double-stranded DNA.  Also check a CCP antibody, angiotensin converting enzyme level, SPEP.     3.  A course of prednisone 40 mg daily for 1 week and let me know if not improved by 1 week.  Otherwise if improved, taper by 10 mg weekly.  Further treatment will develop on result of the labs.         DANIELLE GARCÍA MD             D: 2017 14:24   T: 2017 07:38   MT: [id number]      Name:     LEILA OQUENDO   MRN:      [id number]        Account:      [id number]   :      1970           Visit Date:   2017      Document: [id number]       cc: Cherelle Masters MD

## 2017-03-30 ENCOUNTER — MYC MEDICAL ADVICE (OUTPATIENT)
Dept: RHEUMATOLOGY | Facility: CLINIC | Age: 47
End: 2017-03-30

## 2017-03-30 LAB — ACE SERPL-CCNC: 19 U/L

## 2017-05-02 ENCOUNTER — OFFICE VISIT (OUTPATIENT)
Dept: FAMILY MEDICINE | Facility: CLINIC | Age: 47
End: 2017-05-02
Payer: COMMERCIAL

## 2017-05-02 VITALS
TEMPERATURE: 98.2 F | HEART RATE: 78 BPM | WEIGHT: 226 LBS | DIASTOLIC BLOOD PRESSURE: 79 MMHG | HEIGHT: 73 IN | BODY MASS INDEX: 29.95 KG/M2 | SYSTOLIC BLOOD PRESSURE: 126 MMHG

## 2017-05-02 DIAGNOSIS — F41.9 ANXIETY: Primary | ICD-10-CM

## 2017-05-02 PROCEDURE — 99213 OFFICE O/P EST LOW 20 MIN: CPT | Performed by: PHYSICIAN ASSISTANT

## 2017-05-02 ASSESSMENT — ENCOUNTER SYMPTOMS
WEAKNESS: 0
CONSTIPATION: 0
EYE PAIN: 0
DIARRHEA: 0
BLURRED VISION: 0
NERVOUS/ANXIOUS: 1
HEADACHES: 0
PHOTOPHOBIA: 0
DYSURIA: 0
NECK PAIN: 0
SPUTUM PRODUCTION: 0
HALLUCINATIONS: 0
SORE THROAT: 0
FOCAL WEAKNESS: 0
DIZZINESS: 0
PALPITATIONS: 0
LOSS OF CONSCIOUSNESS: 0
DIAPHORESIS: 0
EYE REDNESS: 0
DEPRESSION: 1
ORTHOPNEA: 0
BLOOD IN STOOL: 0
VOMITING: 0
NEUROLOGICAL NEGATIVE: 1
HEMOPTYSIS: 0
HEARTBURN: 0
INSOMNIA: 0
FREQUENCY: 0
ABDOMINAL PAIN: 0
WHEEZING: 0
COUGH: 0
EYE DISCHARGE: 0
NAUSEA: 0
BACK PAIN: 0
WEIGHT LOSS: 0
TINGLING: 0
DOUBLE VISION: 0
MYALGIAS: 0
SENSORY CHANGE: 0
FEVER: 0
SHORTNESS OF BREATH: 0
SEIZURES: 0

## 2017-05-02 ASSESSMENT — ANXIETY QUESTIONNAIRES
1. FEELING NERVOUS, ANXIOUS, OR ON EDGE: MORE THAN HALF THE DAYS
GAD7 TOTAL SCORE: 10
2. NOT BEING ABLE TO STOP OR CONTROL WORRYING: MORE THAN HALF THE DAYS
7. FEELING AFRAID AS IF SOMETHING AWFUL MIGHT HAPPEN: SEVERAL DAYS
5. BEING SO RESTLESS THAT IT IS HARD TO SIT STILL: SEVERAL DAYS
3. WORRYING TOO MUCH ABOUT DIFFERENT THINGS: MORE THAN HALF THE DAYS
6. BECOMING EASILY ANNOYED OR IRRITABLE: SEVERAL DAYS

## 2017-05-02 ASSESSMENT — PATIENT HEALTH QUESTIONNAIRE - PHQ9: 5. POOR APPETITE OR OVEREATING: SEVERAL DAYS

## 2017-05-02 ASSESSMENT — LIFESTYLE VARIABLES: SUBSTANCE_ABUSE: 0

## 2017-05-02 NOTE — MR AVS SNAPSHOT
"              After Visit Summary   5/2/2017    Ruiz Son    MRN: [id number]           Patient Information     Date Of Birth          1970        Visit Information        Provider Department      5/2/2017 2:20 PM Lino Vincent PA-C [facility]        Today's Diagnoses     Anxiety    -  1       Follow-ups after your visit        Follow-up notes from your care team     Return if symptoms worsen or fail to improve.      Who to contact     If you have questions or need follow up information about today's clinic visit or your schedule please contact [facility] directly at [phone].  Normal or non-critical lab and imaging results will be communicated to you by Upowerhart, letter or phone within 4 business days after the clinic has received the results. If you do not hear from us within 7 days, please contact the clinic through Upowerhart or phone. If you have a critical or abnormal lab result, we will notify you by phone as soon as possible.  Submit refill requests through ClearGist or call your pharmacy and they will forward the refill request to us. Please allow 3 business days for your refill to be completed.          Additional Information About Your Visit        MyChart Information     [employer] gives you secure access to your electronic health record. If you see a primary care provider, you can also send messages to your care team and make appointments. If you have questions, please call your primary care clinic.  If you do not have a primary care provider, please call [phone] and they will assist you.        Care EveryWhere ID     This is your Care EveryWhere ID. This could be used by other organizations to access your [facility] medical records  [id number]        Your Vitals Were     Pulse Temperature Height BMI (Body Mass Index)          78 98.2  F (36.8  C) (Tympanic) 6' 1\" (1.854 m) 29.82 kg/m2         Blood Pressure from Last 3 Encounters:   05/02/17 126/79 "   03/28/17 112/67   03/27/17 110/65    Weight from Last 3 Encounters:   05/02/17 226 lb (102.5 kg)   03/28/17 236 lb (107 kg)   03/27/17 231 lb (104.8 kg)              Today, you had the following     No orders found for display         Today's Medication Changes          These changes are accurate as of: 5/2/17  3:54 PM.  If you have any questions, ask your nurse or doctor.               Start taking these medicines.        Dose/Directions    sertraline 50 MG tablet   Commonly known as:  ZOLOFT   Used for:  Anxiety   Started by:  Lino Vincent PA-C        Dose:  50 mg   Take 1 tablet (50 mg) by mouth daily   Quantity:  30 tablet   Refills:  1         Stop taking these medicines if you haven't already. Please contact your care team if you have questions.     citalopram 40 MG tablet   Commonly known as:  celeXA   Stopped by:  Lino Vincent PA-C           meloxicam 7.5 MG tablet   Commonly known as:  MOBIC   Stopped by:  Lino Vincent PA-C           predniSONE 10 MG tablet   Commonly known as:  DELTASONE   Stopped by:  Lino Vincent PA-C           valACYclovir 1000 mg tablet   Commonly known as:  VALTREX   Stopped by:  Lino Vincent PA-C                Where to get your medicines      These medications were sent to [facility] [address] [place] [address] AT [facility] & [address] [facility] [address]  [address] [facility] [address]     Phone:  [phone]     sertraline 50 MG tablet                Primary Care Provider Office Phone # Fax #    Lino Vincent PA-C [phone]       [facility] [address] [facility] [address]        Thank you!     Thank you for choosing [facility]  for your care. Our goal is always to provide you with excellent care. Hearing back from our patients is one way we can continue to improve our services. Please take a few minutes to complete the written survey that you may receive in the mail after your visit with  us. Thank you!             Your Updated Medication List - Protect others around you: Learn how to safely use, store and throw away your medicines at www.disposemymeds.org.          This list is accurate as of: 5/2/17  3:54 PM.  Always use your most recent med list.                   Brand Name Dispense Instructions for use    atenolol 25 MG tablet    TENORMIN    180 tablet    Take 1 tablet (25 mg) by mouth 2 times daily       sertraline 50 MG tablet    ZOLOFT    30 tablet    Take 1 tablet (50 mg) by mouth daily       testosterone 12.5 MG/ACT (1%) gel    ANDROGEL 1% PUMP    1 Month    Place 4 pumps (50 mg) onto the skin daily Apply from dispenser to clean, dry, intact skin of the shoulders, upper arms, or abdomen.

## 2017-05-02 NOTE — NURSING NOTE
"Chief Complaint   Patient presents with     Depression     Anxiety       Initial /79 (BP Location: Right arm, Patient Position: Chair, Cuff Size: Adult Large)  Pulse 78  Temp 98.2  F (36.8  C) (Tympanic)  Ht 6' 1\" (1.854 m)  Wt 226 lb (102.5 kg)  BMI 29.82 kg/m2 Estimated body mass index is 29.82 kg/(m^2) as calculated from the following:    Height as of this encounter: 6' 1\" (1.854 m).    Weight as of this encounter: 226 lb (102.5 kg).  Medication Reconciliation: complete    Health Maintenance that is potentially due pending provider review:  NONE    Karen BLANKENSHIP MA        "

## 2017-05-02 NOTE — PROGRESS NOTES
HPI    SUBJECTIVE:                                                    Ruiz Son is a 46 year old male who presents to clinic today for follow-up of his depression and anxiety. The citalopram does not seem to be working as well as previously and he would like to try a different medication. He has been on Prozac in the past and he is not sure how well that worked and he has also been on Effexor in the past. He denies any suicidal thoughts.        Depression and Anxiety Follow-Up    Status since last visit: Worsened has been starting to feel some anxiety and depression with the current dose of medication that he is on.  Has been on it for quite a few years.    Other associated symptoms:None    Complicating factors:     Significant life event: No     Current substance abuse: None    PHQ-9 SCORE 5/23/2011 6/15/2016 3/16/2017   Total Score 3 - -   Total Score - 2 4     TRISHA-7 SCORE 6/15/2016 3/16/2017   Total Score 0 4        PHQ-9  English      PHQ-9   Any Language     GAD7             Problem list and histories reviewed & adjusted, as indicated.  Additional history: as documented    Patient Active Problem List   Diagnosis     Essential hypertension     Generalized anxiety disorder     HYPERLIPIDEMIA LDL GOAL <130     Mild major depression (H)     Past Surgical History:   Procedure Laterality Date     SURGICAL HISTORY OF -   age 15    RIGHT varicocelectomy       Social History   Substance Use Topics     Smoking status: Never Smoker     Smokeless tobacco: Never Used     Alcohol use 0.0 oz/week     0 Standard drinks or equivalent per week      Comment: very little/   1-2 xmonth     Family History   Problem Relation Age of Onset     Hypertension Mother      CANCER Father      esophageal     Hypertension Brother      Prostate Cancer Other      paternal uncle     Cancer - colorectal No family hx of      C.A.D. No family hx of          Current Outpatient Prescriptions   Medication Sig Dispense Refill     sertraline  (ZOLOFT) 50 MG tablet Take 1 tablet (50 mg) by mouth daily 30 tablet 1     testosterone (ANDROGEL 1% PUMP) 12.5 MG/ACT (1%) gel Place 4 pumps (50 mg) onto the skin daily Apply from dispenser to clean, dry, intact skin of the shoulders, upper arms, or abdomen. 1 Month 5     atenolol (TENORMIN) 25 MG tablet Take 1 tablet (25 mg) by mouth 2 times daily 180 tablet 3     No Known Allergies  Labs reviewed in EPIC    Reviewed and updated as needed this visit by clinical staff       Reviewed and updated as needed this visit by Provider               Review of Systems   Constitutional: Negative for diaphoresis, fever, malaise/fatigue and weight loss.   HENT: Negative for congestion, ear discharge, ear pain, hearing loss, nosebleeds and sore throat.    Eyes: Negative for blurred vision, double vision, photophobia, pain, discharge and redness.   Respiratory: Negative for cough, hemoptysis, sputum production, shortness of breath and wheezing.    Cardiovascular: Negative for chest pain, palpitations, orthopnea and leg swelling.   Gastrointestinal: Negative for abdominal pain, blood in stool, constipation, diarrhea, heartburn, melena, nausea and vomiting.   Genitourinary: Negative.  Negative for dysuria, frequency and urgency.   Musculoskeletal: Negative for back pain, joint pain, myalgias and neck pain.   Skin: Negative for itching and rash.   Neurological: Negative.  Negative for dizziness, tingling, sensory change, focal weakness, seizures, loss of consciousness, weakness and headaches.   Endo/Heme/Allergies: Negative.    Psychiatric/Behavioral: Positive for depression. Negative for hallucinations, substance abuse and suicidal ideas. The patient is nervous/anxious. The patient does not have insomnia.          Physical Exam   Constitutional: He is oriented to person, place, and time and well-developed, well-nourished, and in no distress.   HENT:   Head: Normocephalic and atraumatic.   Right Ear: External ear normal.   Left Ear:  External ear normal.   Nose: Nose normal.   Mouth/Throat: Oropharynx is clear and moist.   Eyes: Conjunctivae and EOM are normal. Pupils are equal, round, and reactive to light. Right eye exhibits no discharge. Left eye exhibits no discharge. No scleral icterus.   Neck: Normal range of motion. Neck supple. No thyromegaly present.   Cardiovascular: Normal rate, regular rhythm, normal heart sounds and intact distal pulses.  Exam reveals no gallop and no friction rub.    No murmur heard.  Pulmonary/Chest: Effort normal and breath sounds normal. No respiratory distress. He has no wheezes. He has no rales. He exhibits no tenderness.   Abdominal: Soft. Bowel sounds are normal. He exhibits no distension and no mass. There is no tenderness. There is no rebound and no guarding.   Musculoskeletal: Normal range of motion. He exhibits no edema or tenderness.   Lymphadenopathy:     He has no cervical adenopathy.   Neurological: He is alert and oriented to person, place, and time. He has normal reflexes. No cranial nerve deficit. He exhibits normal muscle tone. Gait normal. Coordination normal.   Skin: Skin is warm and dry. No rash noted. No erythema.   Psychiatric: Memory, affect and judgment normal. His mood appears anxious. He exhibits a depressed mood. He expresses no homicidal and no suicidal ideation. He expresses no suicidal plans and no homicidal plans.         (F41.9) Anxiety  (primary encounter diagnosis)  Comment:   Plan: sertraline (ZOLOFT) 50 MG tablet           At this time we will discontinue citalopram and begin Zoloft follow up in the next 2-4 weeks or sooner if problems present

## 2017-05-03 ASSESSMENT — ANXIETY QUESTIONNAIRES: GAD7 TOTAL SCORE: 10

## 2017-05-03 ASSESSMENT — PATIENT HEALTH QUESTIONNAIRE - PHQ9: SUM OF ALL RESPONSES TO PHQ QUESTIONS 1-9: 6

## 2017-06-24 DIAGNOSIS — F41.9 ANXIETY: ICD-10-CM

## 2017-06-24 NOTE — TELEPHONE ENCOUNTER
sertraline (ZOLOFT) 50 MG tablet     Last Written Prescription Date: 5/2/17  Last Fill Quantity: 30, # refills: 0  Last Office Visit with G primary care provider:  5/2/17        Last PHQ-9 score on record=   PHQ-9 SCORE 5/2/2017   Total Score -   Total Score 6

## 2017-07-06 DIAGNOSIS — I10 ESSENTIAL HYPERTENSION WITH GOAL BLOOD PRESSURE LESS THAN 130/80: ICD-10-CM

## 2017-07-06 RX ORDER — ATENOLOL 25 MG/1
TABLET ORAL
Qty: 180 TABLET | Refills: 1 | Status: SHIPPED | OUTPATIENT
Start: 2017-07-06 | End: 2017-08-21

## 2017-07-06 NOTE — TELEPHONE ENCOUNTER
atenolol (TENORMIN) 25 MG tablet      Last Written Prescription Date: 9/21/16  Last Fill Quantity: 180, # refills: 3  Last Office Visit with G, P or [facility] prescribing provider: 5/2/17       Potassium   Date Value Ref Range Status   03/22/2017 3.2 (L) 3.4 - 5.3 mmol/L Final     Creatinine   Date Value Ref Range Status   03/22/2017 1.03 0.66 - 1.25 mg/dL Final     BP Readings from Last 3 Encounters:   05/02/17 126/79   03/28/17 112/67   03/27/17 110/65

## 2017-07-16 ENCOUNTER — HOSPITAL ENCOUNTER (EMERGENCY)
Facility: CLINIC | Age: 47
Discharge: HOME | End: 2017-07-16
Attending: EMERGENCY MEDICINE | Admitting: EMERGENCY MEDICINE
Payer: COMMERCIAL

## 2017-07-16 ENCOUNTER — NURSE TRIAGE (OUTPATIENT)
Dept: NURSING | Facility: CLINIC | Age: 47
End: 2017-07-16

## 2017-07-16 VITALS
SYSTOLIC BLOOD PRESSURE: 137 MMHG | TEMPERATURE: 97.9 F | DIASTOLIC BLOOD PRESSURE: 91 MMHG | OXYGEN SATURATION: 99 % | RESPIRATION RATE: 16 BRPM

## 2017-07-16 DIAGNOSIS — S06.0X1A CONCUSSION WITH LOSS OF CONSCIOUSNESS, WITH LOC OF 30 MIN OR LESS, INITIAL ENCOUNTER: ICD-10-CM
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
## 2017-07-16 DIAGNOSIS — S09.90XA CLOSED HEAD INJURY, INITIAL ENCOUNTER: ICD-10-CM

## 2017-07-16 DIAGNOSIS — T07.XXXA MULTIPLE ABRASIONS: ICD-10-CM

## 2017-07-16 PROCEDURE — 99283 EMERGENCY DEPT VISIT LOW MDM: CPT | Performed by: EMERGENCY MEDICINE

## 2017-07-16 PROCEDURE — 99282 EMERGENCY DEPT VISIT SF MDM: CPT
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
## 2017-07-16 NOTE — ED AVS SNAPSHOT
[facility] Emergency Department    [address] [address]    Phone:  [phone]    Fax:  [phone]                                       Ruiz Son   MRN: [id number]    Department:  [facility] Emergency Department   Date of Visit:  7/16/2017           After Visit Summary Signature Page     I have received my discharge instructions, and my questions have been answered. I have discussed any challenges I see with this plan with the nurse or doctor.    ..........................................................................................................................................  Patient/Patient Representative Signature      ..........................................................................................................................................  Patient Representative Print Name and Relationship to Patient    ..................................................               ................................................  Date                                            Time    ..........................................................................................................................................  Reviewed by Signature/Title    ...................................................              ..............................................  Date                                                            Time
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
## 2017-07-17 NOTE — ED NOTES
DC instructions reviewed with pt and spouse states understanding. Pt ambulatory out of ER no c/o pain on DC.

## 2017-07-17 NOTE — DISCHARGE INSTRUCTIONS
* HEAD INJURY, no wake-up (Adult)    You have had a head injury. It does not appear serious at this time. Sometimes symptoms of a more serious problem (concussion, bruising or bleeding in the brain) may appear later. Therefore, watch for the warning signs listed below.  HOME CARE:    During the next 24 hours someone must stay with you to check for the signs below. It is not necessary to stay awake or be awakened during the night.    If you have swelling of the face or scalp, apply an ice pack (ice cubes in a plastic bag, wrapped in a towel) for 20 minutes. Do this every 1-2 hours until the swelling starts to go down.    You may use acetaminophen (Tylenol) 650-1000 mg every 6 hours or ibuprofen (Motrin, Advil) 600 mg every 6-8 hours with food to control pain, if you are able to take these medicines. [NOTE: If you have chronic liver or kidney disease or ever had a stomach ulcer or GI bleeding, talk with your doctor before using these medicines.] Do not take aspirin after a head injury.    For the next 24 hours:    Do not take alcohol, sedatives or medicines that make you sleepy.    Do not drive or operate machinery.    Avoid strenuous activities. No lifting or straining.    If you have had any symptoms of a concussion today (nausea, vomiting, dizziness, confusion, headache, memory loss or if you were knocked out), do not return to sports or any activity that could result in another head injury until 2 full weeks after all symptoms are gone and you have been cleared by your doctor. A second head injury before fully recovering from the first one can lead to serious brain injury.  FOLLOW UP with your doctor if symptoms are not improving after 24 hours, or as directed.  GET PROMPT MEDICAL ATTENTION if any of the following warning signs occur:    Repeated vomiting    Severe or worsening headache or dizziness    Unusual drowsiness, or unable to awaken as usual    Confusion or change in behavior or speech, memory loss,  blurred vision    Convulsion (seizure)    Increasing scalp or face swelling    Redness, warmth or pus from the swollen area    Fluid drainage or bleeding from the nose or ears    0631-0636 [facility], [address] [place] [address], [place] [address]. All rights reserved. This information is not intended as a substitute for professional medical care. Always follow your healthcare professional's instructions.

## 2017-08-18 ENCOUNTER — TELEPHONE (OUTPATIENT)
Dept: FAMILY MEDICINE | Facility: CLINIC | Age: 47
End: 2017-08-18

## 2017-08-18 DIAGNOSIS — I10 BENIGN ESSENTIAL HYPERTENSION: Primary | ICD-10-CM

## 2017-08-18 RX ORDER — METOPROLOL TARTRATE 25 MG/1
25 TABLET, FILM COATED ORAL 2 TIMES DAILY
Qty: 60 TABLET | Refills: 1 | Status: SHIPPED | OUTPATIENT
Start: 2017-08-18 | End: 2017-10-31

## 2017-08-18 NOTE — TELEPHONE ENCOUNTER
Pharmacy says he is on Atenolol 25 mg tablets taking one tablet twice daily.  Atenolol currently on backorder. If OK please send new alternative medication. Thanks

## 2017-08-26 DIAGNOSIS — F41.9 ANXIETY: ICD-10-CM

## 2017-08-26 NOTE — TELEPHONE ENCOUNTER
sertraline (ZOLOFT) 50 MG tablet     Last Written Prescription Date: 6/26/17  Last Fill Quantity: 30, # refills: 1  Last Office Visit with [facility] primary care provider:  5/2/17        Last PHQ-9 score on record=   PHQ-9 SCORE 5/2/2017   Total Score -   Total Score 6     Arianna POTTER)

## 2017-10-12 ENCOUNTER — TELEPHONE (OUTPATIENT)
Dept: FAMILY MEDICINE | Facility: CLINIC | Age: 47
End: 2017-10-12

## 2017-10-12 DIAGNOSIS — F41.9 ANXIETY: ICD-10-CM

## 2017-10-12 NOTE — TELEPHONE ENCOUNTER
Caller would like to increase his Zoloft from 1-2 tabs as per discussion with provider, feels symptoms are better managed on 2 tablets. He would like to confirm if this change in medication dosage could be done over the phone or if he needs to come back in to the clinic. He agreed with plan for message to be sent to prescribing physician to follow up with him via phone tomorrow 10/13/17 at number listed in chart. No triage required.    Mable Colon, RN, BSN  [facility]

## 2017-10-13 ENCOUNTER — TELEPHONE (OUTPATIENT)
Dept: FAMILY MEDICINE | Facility: CLINIC | Age: 47
End: 2017-10-13

## 2017-10-13 DIAGNOSIS — F32.0 MILD MAJOR DEPRESSION (H): Primary | ICD-10-CM

## 2017-10-13 DIAGNOSIS — F41.1 GENERALIZED ANXIETY DISORDER: ICD-10-CM

## 2017-10-13 RX ORDER — SERTRALINE HYDROCHLORIDE 100 MG/1
100 TABLET, FILM COATED ORAL DAILY
Qty: 90 TABLET | Refills: 0 | Status: SHIPPED | OUTPATIENT
Start: 2017-10-13 | End: 2018-01-10

## 2017-10-13 ASSESSMENT — ANXIETY QUESTIONNAIRES
5. BEING SO RESTLESS THAT IT IS HARD TO SIT STILL: SEVERAL DAYS
GAD7 TOTAL SCORE: 9
3. WORRYING TOO MUCH ABOUT DIFFERENT THINGS: MORE THAN HALF THE DAYS
7. FEELING AFRAID AS IF SOMETHING AWFUL MIGHT HAPPEN: SEVERAL DAYS
IF YOU CHECKED OFF ANY PROBLEMS ON THIS QUESTIONNAIRE, HOW DIFFICULT HAVE THESE PROBLEMS MADE IT FOR YOU TO DO YOUR WORK, TAKE CARE OF THINGS AT HOME, OR GET ALONG WITH OTHER PEOPLE: SOMEWHAT DIFFICULT
1. FEELING NERVOUS, ANXIOUS, OR ON EDGE: SEVERAL DAYS
6. BECOMING EASILY ANNOYED OR IRRITABLE: SEVERAL DAYS
2. NOT BEING ABLE TO STOP OR CONTROL WORRYING: MORE THAN HALF THE DAYS

## 2017-10-13 ASSESSMENT — PATIENT HEALTH QUESTIONNAIRE - PHQ9
SUM OF ALL RESPONSES TO PHQ QUESTIONS 1-9: 12
5. POOR APPETITE OR OVEREATING: SEVERAL DAYS

## 2017-10-13 NOTE — TELEPHONE ENCOUNTER
Pt called. TRISHA 9, Phq 9-12 today. Pt states he would like to increase his zoloft to 100 mg, states he feels his symptoms are better managed at that dose. Please advice. Liliana Lynn RN

## 2017-10-13 NOTE — TELEPHONE ENCOUNTER
Vale sent Message:  Sertraline 50 mg tabs (SIG: Take 2 tabs by mouth daily) was received today at pharmacy.  His insurance does not cover 2 daily. Please change to 100 mg tabs SIG:  One daily for insurance coverage.    Thank you.

## 2017-10-14 ASSESSMENT — ANXIETY QUESTIONNAIRES: GAD7 TOTAL SCORE: 9

## 2017-10-31 DIAGNOSIS — I10 BENIGN ESSENTIAL HYPERTENSION: ICD-10-CM

## 2017-10-31 RX ORDER — METOPROLOL TARTRATE 25 MG/1
TABLET, FILM COATED ORAL
Qty: 60 TABLET | Refills: 0 | Status: SHIPPED | OUTPATIENT
Start: 2017-10-31 | End: 2017-12-03

## 2017-12-03 DIAGNOSIS — I10 BENIGN ESSENTIAL HYPERTENSION: ICD-10-CM

## 2017-12-04 RX ORDER — METOPROLOL TARTRATE 25 MG/1
TABLET, FILM COATED ORAL
Qty: 60 TABLET | Refills: 2 | Status: SHIPPED | OUTPATIENT
Start: 2017-12-04 | End: 2018-02-25

## 2017-12-17 ENCOUNTER — OFFICE VISIT (OUTPATIENT)
Dept: URGENT CARE | Facility: URGENT CARE | Age: 47
End: 2017-12-17
Payer: COMMERCIAL

## 2017-12-17 VITALS
HEART RATE: 64 BPM | BODY MASS INDEX: 29.29 KG/M2 | WEIGHT: 222 LBS | TEMPERATURE: 96.3 F | OXYGEN SATURATION: 97 % | SYSTOLIC BLOOD PRESSURE: 135 MMHG | DIASTOLIC BLOOD PRESSURE: 89 MMHG

## 2017-12-17 DIAGNOSIS — L73.1 INGROWN HAIR: ICD-10-CM

## 2017-12-17 DIAGNOSIS — L08.9 SUPERFICIAL SKIN INFECTION: Primary | ICD-10-CM

## 2017-12-17 PROCEDURE — 99213 OFFICE O/P EST LOW 20 MIN: CPT | Performed by: NURSE PRACTITIONER

## 2017-12-17 RX ORDER — CEPHALEXIN 500 MG/1
500 CAPSULE ORAL 2 TIMES DAILY
Qty: 14 CAPSULE | Refills: 0 | Status: SHIPPED | OUTPATIENT
Start: 2017-12-17 | End: 2017-12-24

## 2017-12-17 NOTE — MR AVS SNAPSHOT
After Visit Summary   12/17/2017    Ruiz Son    MRN: [id number]           Patient Information     Date Of Birth          1970        Visit Information        Provider Department      12/17/2017 11:55 AM Blanquita Hernandes APRN CNP [facility]        Today's Diagnoses     Superficial skin infection    -  1    Ingrown hair          Care Instructions    Take antibiotic as directed.    Hot packs 3-4 times daily.    Follow up with primary care provider is symptoms worsen or persist.      Indications for emergent return to emergency department discussed with patient, who verbalized good understanding and agreement.  Patient understands the limitations of today's evaluation.                 Follow-ups after your visit        Follow-up notes from your care team     See patient instructions section of the AVS Return if symptoms worsen or fail to improve, for Follow up with your primary care provider.      Who to contact     If you have questions or need follow up information about today's clinic visit or your schedule please contact [facility] directly at [phone].  Normal or non-critical lab and imaging results will be communicated to you by TDXhart, letter or phone within 4 business days after the clinic has received the results. If you do not hear from us within 7 days, please contact the clinic through [email or web address] or phone. If you have a critical or abnormal lab result, we will notify you by phone as soon as possible.  Submit refill requests through SentinelOne or call your pharmacy and they will forward the refill request to us. Please allow 3 business days for your refill to be completed.          Additional Information About Your Visit        MyChart Information     SentinelOne gives you secure access to your electronic health record. If you see a primary care provider, you can also send messages to your care team and make  appointments. If you have questions, please call your primary care clinic.  If you do not have a primary care provider, please call [phone] and they will assist you.        Care EveryWhere ID     This is your Care EveryWhere ID. This could be used by other organizations to access your [facility] medical records  [id number]        Your Vitals Were     Pulse Temperature Pulse Oximetry BMI (Body Mass Index)          64 96.3  F (35.7  C) (Tympanic) 97% 29.29 kg/m2         Blood Pressure from Last 3 Encounters:   12/17/17 135/89   07/16/17 (!) 137/91   05/02/17 126/79    Weight from Last 3 Encounters:   12/17/17 222 lb (100.7 kg)   05/02/17 226 lb (102.5 kg)   03/28/17 236 lb (107 kg)              Today, you had the following     No orders found for display         Today's Medication Changes          These changes are accurate as of: 12/17/17  2:24 PM.  If you have any questions, ask your nurse or doctor.               Start taking these medicines.        Dose/Directions    cephALEXin 500 MG capsule   Commonly known as:  KEFLEX   Used for:  Ingrown hair   Started by:  Blanquita Hernandes APRN CNP        Dose:  500 mg   Take 1 capsule (500 mg) by mouth 2 times daily for 7 days   Quantity:  14 capsule   Refills:  0            Where to get your medicines      These medications were sent to [facility] [address] [place] [address] AT [facility] &  [address]  [address] [facility] [address]     Phone:  [phone]     cephALEXin 500 MG capsule                Primary Care Provider Office Phone # Fax #    Lino Vincent PA-C [phone] [address] [place] [address]        Equal Access to Services     CLAUDETTE ASH AH: Fantasma Hatfield, marielena marsh, holly dodson, marjorie huffman. So [facility] [phone].    ATENCIÓN: Si habla español, tiene a shields disposición servicios gratuitos de asistencia lingüística.  Kym becker [phone].    We comply with applicable federal civil rights laws and [place] laws. We do not discriminate on the basis of race, color, national origin, age, disability, sex, sexual orientation, or gender identity.            Thank you!     Thank you for choosing [facility]  for your care. Our goal is always to provide you with excellent care. Hearing back from our patients is one way we can continue to improve our services. Please take a few minutes to complete the written survey that you may receive in the mail after your visit with us. Thank you!             Your Updated Medication List - Protect others around you: Learn how to safely use, store and throw away your medicines at www.disposemymeds.org.          This list is accurate as of: 12/17/17  2:24 PM.  Always use your most recent med list.                   Brand Name Dispense Instructions for use Diagnosis    cephALEXin 500 MG capsule    KEFLEX    14 capsule    Take 1 capsule (500 mg) by mouth 2 times daily for 7 days    Ingrown hair       metoprolol 25 MG tablet    LOPRESSOR    60 tablet    TAKE 1 TABLET BY MOUTH TWICE DAILY    Benign essential hypertension       sertraline 100 MG tablet    ZOLOFT    90 tablet    Take 1 tablet (100 mg) by mouth daily    Mild major depression (H), Generalized anxiety disorder       testosterone 12.5 MG/ACT (1%) gel    ANDROGEL 1% PUMP    1 Month    Place 4 pumps (50 mg) onto the skin daily Apply from dispenser to clean, dry, intact skin of the shoulders, upper arms, or abdomen.    Hypogonadism male

## 2017-12-17 NOTE — PATIENT INSTRUCTIONS
Take antibiotic as directed.    Hot packs 3-4 times daily.    Follow up with primary care provider is symptoms worsen or persist.      Indications for emergent return to emergency department discussed with patient, who verbalized good understanding and agreement.  Patient understands the limitations of today's evaluation.

## 2017-12-17 NOTE — PROGRESS NOTES
SUBJECTIVE:   Ruiz Son is a 47 year old male who presents to clinic today for the following health issues:      Chief Complaint   Patient presents with     Derm Problem     left leg, on going for months, some redness, started bleeding today- blood was thick, only hurts when you touch it            Problem list and histories reviewed & adjusted, as indicated.  Additional history: as documented    Patient Active Problem List   Diagnosis     Essential hypertension     Generalized anxiety disorder     HYPERLIPIDEMIA LDL GOAL <130     Mild major depression (H)     Past Surgical History:   Procedure Laterality Date     SURGICAL HISTORY OF -   age 15    RIGHT varicocelectomy       Social History   Substance Use Topics     Smoking status: Never Smoker     Smokeless tobacco: Never Used     Alcohol use 0.0 oz/week     0 Standard drinks or equivalent per week      Comment: very little/   1-2 xmonth     Family History   Problem Relation Age of Onset     Hypertension Mother      CANCER Father      esophageal     Hypertension Brother      Prostate Cancer Other      paternal uncle     Cancer - colorectal No family hx of      C.A.D. No family hx of          Current Outpatient Prescriptions   Medication Sig Dispense Refill     cephALEXin (KEFLEX) 500 MG capsule Take 1 capsule (500 mg) by mouth 2 times daily for 7 days 14 capsule 0     metoprolol (LOPRESSOR) 25 MG tablet TAKE 1 TABLET BY MOUTH TWICE DAILY 60 tablet 2     sertraline (ZOLOFT) 100 MG tablet Take 1 tablet (100 mg) by mouth daily 90 tablet 0     testosterone (ANDROGEL 1% PUMP) 12.5 MG/ACT (1%) gel Place 4 pumps (50 mg) onto the skin daily Apply from dispenser to clean, dry, intact skin of the shoulders, upper arms, or abdomen. (Patient not taking: Reported on 12/17/2017) 1 Month 5     No Known Allergies  Labs reviewed in EPIC      Reviewed and updated as needed this visit by clinical staffTobacco  Allergies  Meds  Problems  Med Hx  Surg Hx  Fam Hx  Soc Hx         Reviewed and updated as needed this visit by Provider  Allergies  Meds  Problems         ROS:  Constitutional, HEENT, cardiovascular, pulmonary, GI, , musculoskeletal, neuro, skin, endocrine and psych systems are negative, except as otherwise noted.      OBJECTIVE:   /89  Pulse 64  Temp 96.3  F (35.7  C) (Tympanic)  Wt 222 lb (100.7 kg)  SpO2 97%  BMI 29.29 kg/m2  Body mass index is 29.29 kg/(m^2).   GENERAL: healthy, alert and no distress  EYES: Eyes grossly normal to inspection, PERRL and conjunctivae and sclerae normal  HENT: normocephalic  MS: no gross musculoskeletal defects noted, no edema  Ingrown hair left leg medial just below knee. Induration jewell size. May be scar tissue. Wife squeezed it hard to see if they could get something out of it.  Diagnostic Test Results:  No results found for this or any previous visit (from the past 24 hour(s)).    ASSESSMENT/PLAN:     Problem List Items Addressed This Visit     None      Visit Diagnoses     Superficial skin infection    -  Primary    Ingrown hair        Relevant Medications    cephALEXin (KEFLEX) 500 MG capsule               Patient Instructions   Take antibiotic as directed.    Hot packs 3-4 times daily.    Follow up with primary care provider is symptoms worsen or persist.      Indications for emergent return to emergency department discussed with patient, who verbalized good understanding and agreement.  Patient understands the limitations of today's evaluation.             KEILY Harden [facility]

## 2018-01-10 DIAGNOSIS — F32.0 MILD MAJOR DEPRESSION (H): ICD-10-CM

## 2018-01-10 DIAGNOSIS — F41.1 GENERALIZED ANXIETY DISORDER: ICD-10-CM

## 2018-01-10 RX ORDER — SERTRALINE HYDROCHLORIDE 100 MG/1
TABLET, FILM COATED ORAL
Qty: 90 TABLET | Refills: 0 | Status: SHIPPED | OUTPATIENT
Start: 2018-01-10 | End: 2018-04-04

## 2018-02-25 DIAGNOSIS — I10 BENIGN ESSENTIAL HYPERTENSION: ICD-10-CM

## 2018-02-26 RX ORDER — METOPROLOL TARTRATE 25 MG/1
TABLET, FILM COATED ORAL
Qty: 60 TABLET | Refills: 5 | Status: SHIPPED | OUTPATIENT
Start: 2018-02-26 | End: 2018-09-01

## 2018-03-20 ENCOUNTER — APPOINTMENT (OUTPATIENT)
Dept: OCCUPATIONAL MEDICINE | Facility: CLINIC | Age: 48
End: 2018-03-20

## 2018-03-20 PROCEDURE — 82075 ASSAY OF BREATH ETHANOL: CPT | Performed by: PHYSICIAN ASSISTANT

## 2018-03-20 PROCEDURE — 99000 SPECIMEN HANDLING OFFICE-LAB: CPT | Performed by: PHYSICIAN ASSISTANT

## 2018-04-04 DIAGNOSIS — F41.1 GENERALIZED ANXIETY DISORDER: ICD-10-CM

## 2018-04-04 DIAGNOSIS — F32.0 MILD MAJOR DEPRESSION (H): ICD-10-CM

## 2018-04-04 RX ORDER — SERTRALINE HYDROCHLORIDE 100 MG/1
TABLET, FILM COATED ORAL
Qty: 90 TABLET | Refills: 0 | Status: SHIPPED | OUTPATIENT
Start: 2018-04-04 | End: 2019-12-23

## 2018-04-04 NOTE — TELEPHONE ENCOUNTER
"Requested Prescriptions   Pending Prescriptions Disp Refills     sertraline (ZOLOFT) 100 MG tablet [Pharmacy Med Name: SERTRALINE 100MG TABLETS] 90 tablet 0     Sig: TAKE 1 TABLET(100 MG) BY MOUTH DAILY    SSRIs Protocol Failed    4/4/2018  3:28 AM       Failed - PHQ-9 score less than 5 in past 6 months    Please review last PHQ-9 score.          Failed - Recent (6 mo) or future (30 days) visit within the authorizing provider's specialty    Patient had office visit in the last 6 months or has a visit in the next 30 days with authorizing provider or within the authorizing provider's specialty.  See \"Patient Info\" tab in inbasket, or \"Choose Columns\" in Meds & Orders section of the refill encounter.           Passed - Patient is age 18 or older        PHQ-9 SCORE 3/16/2017 5/2/2017 10/13/2017   Total Score - - -   Total Score 4 6 12     TRISHA-7 SCORE 3/16/2017 5/2/2017 10/13/2017   Total Score 4 10 9       Last Written Prescription Date:  1/10/18  Last Fill Quantity: 90,  # refills: 0   Last office visit: 5/2/2017 with prescribing provider:     Future Office Visit:      "

## 2018-09-01 DIAGNOSIS — I10 BENIGN ESSENTIAL HYPERTENSION: ICD-10-CM

## 2018-09-02 NOTE — TELEPHONE ENCOUNTER
"Requested Prescriptions   Pending Prescriptions Disp Refills     metoprolol tartrate (LOPRESSOR) 25 MG tablet   Last Written Prescription Date:  2/26/18  Last Fill Quantity: 60,  # refills: 5   Last office visit: 5/2/2017 with prescribing provider:  SAULO Vincent   Future Office Visit:     60 tablet 0     Sig: TAKE 1 TABLET BY MOUTH TWICE DAILY    Beta-Blockers Protocol Failed    9/1/2018 12:22 PM       Failed - Recent (12 mo) or future (30 days) visit within the authorizing provider's specialty    Patient had office visit in the last 12 months or has a visit in the next 30 days with authorizing provider or within the authorizing provider's specialty.  See \"Patient Info\" tab in inbasket, or \"Choose Columns\" in Meds & Orders section of the refill encounter.           Passed - Blood pressure under 140/90 in past 12 months    BP Readings from Last 3 Encounters:   12/17/17 135/89   07/16/17 (!) 137/91   05/02/17 126/79                Passed - Patient is age 6 or older          "

## 2018-09-04 RX ORDER — METOPROLOL TARTRATE 25 MG/1
TABLET, FILM COATED ORAL
Qty: 60 TABLET | Refills: 0 | Status: SHIPPED | OUTPATIENT
Start: 2018-09-04 | End: 2019-12-23

## 2019-09-04 DIAGNOSIS — I10 BENIGN ESSENTIAL HYPERTENSION: ICD-10-CM

## 2019-09-04 NOTE — TELEPHONE ENCOUNTER
"Requested Prescriptions   Pending Prescriptions Disp Refills     metoprolol tartrate (LOPRESSOR) 25 MG tablet 60 tablet 0     Sig: Take 1 tablet (25 mg) by mouth 2 times daily       Beta-Blockers Protocol Failed - 9/4/2019  2:16 PM        Failed - Blood pressure under 140/90 in past 12 months     BP Readings from Last 3 Encounters:   12/17/17 135/89   07/16/17 (!) 137/91   05/02/17 126/79                 Failed - Recent (12 mo) or future (30 days) visit within the authorizing provider's specialty     Patient had office visit in the last 12 months or has a visit in the next 30 days with authorizing provider or within the authorizing provider's specialty.  See \"Patient Info\" tab in inbasket, or \"Choose Columns\" in Meds & Orders section of the refill encounter.              Passed - Patient is age 6 or older        Passed - Medication is active on med list        Last Written Prescription Date:  09/04/18  Last Fill Quantity: 60,  # refills: 0   Last office visit: 5/2/2017 with prescribing provider:  05/04/18   Future Office Visit:      "

## 2019-09-05 RX ORDER — METOPROLOL TARTRATE 25 MG/1
25 TABLET, FILM COATED ORAL 2 TIMES DAILY
Qty: 60 TABLET | Refills: 0 | OUTPATIENT
Start: 2019-09-05

## 2019-09-05 NOTE — TELEPHONE ENCOUNTER
Last seen by Noble Vincent in May 2017.Per Chart looks like he has been Seeing a provider at [facility]. Pharmacy notified. Liliana Lynn RN

## 2019-11-03 ENCOUNTER — HEALTH MAINTENANCE LETTER (OUTPATIENT)
Age: 49
End: 2019-11-03

## 2019-12-23 ENCOUNTER — OFFICE VISIT (OUTPATIENT)
Dept: FAMILY MEDICINE | Facility: CLINIC | Age: 49
End: 2019-12-23
Payer: COMMERCIAL

## 2019-12-23 VITALS
TEMPERATURE: 98.3 F | SYSTOLIC BLOOD PRESSURE: 138 MMHG | BODY MASS INDEX: 34.81 KG/M2 | HEART RATE: 88 BPM | RESPIRATION RATE: 20 BRPM | HEIGHT: 72 IN | WEIGHT: 257 LBS | DIASTOLIC BLOOD PRESSURE: 89 MMHG | OXYGEN SATURATION: 98 %

## 2019-12-23 DIAGNOSIS — I10 ESSENTIAL HYPERTENSION: ICD-10-CM

## 2019-12-23 DIAGNOSIS — Z12.11 SPECIAL SCREENING FOR MALIGNANT NEOPLASMS, COLON: ICD-10-CM

## 2019-12-23 DIAGNOSIS — F41.1 GENERALIZED ANXIETY DISORDER: ICD-10-CM

## 2019-12-23 DIAGNOSIS — F32.0 MILD MAJOR DEPRESSION (H): Primary | ICD-10-CM

## 2019-12-23 DIAGNOSIS — R73.09 ELEVATED GLUCOSE: ICD-10-CM

## 2019-12-23 LAB — HBA1C MFR BLD: 5.4 % (ref 0–5.6)

## 2019-12-23 PROCEDURE — 99214 OFFICE O/P EST MOD 30 MIN: CPT | Mod: 25 | Performed by: FAMILY MEDICINE

## 2019-12-23 PROCEDURE — 90682 RIV4 VACC RECOMBINANT DNA IM: CPT | Performed by: FAMILY MEDICINE

## 2019-12-23 PROCEDURE — 36415 COLL VENOUS BLD VENIPUNCTURE: CPT | Performed by: FAMILY MEDICINE

## 2019-12-23 PROCEDURE — 90471 IMMUNIZATION ADMIN: CPT | Performed by: FAMILY MEDICINE

## 2019-12-23 PROCEDURE — 83036 HEMOGLOBIN GLYCOSYLATED A1C: CPT | Performed by: FAMILY MEDICINE

## 2019-12-23 RX ORDER — TOLTERODINE TARTRATE 1 MG/1
1 TABLET, EXTENDED RELEASE ORAL
COMMUNITY
Start: 2019-10-23 | End: 2019-12-23

## 2019-12-23 RX ORDER — MIRTAZAPINE 15 MG/1
15 TABLET, FILM COATED ORAL AT BEDTIME
Qty: 90 TABLET | Refills: 3 | Status: SHIPPED | OUTPATIENT
Start: 2019-12-23 | End: 2020-12-29

## 2019-12-23 RX ORDER — METOPROLOL SUCCINATE 50 MG/1
50 TABLET, EXTENDED RELEASE ORAL DAILY
Qty: 90 TABLET | Refills: 3 | Status: SHIPPED | OUTPATIENT
Start: 2019-12-23 | End: 2020-12-29

## 2019-12-23 RX ORDER — METOPROLOL SUCCINATE 50 MG/1
TABLET, EXTENDED RELEASE ORAL
COMMUNITY
Start: 2019-12-10 | End: 2019-12-23

## 2019-12-23 RX ORDER — BUPROPION HYDROCHLORIDE 150 MG/1
TABLET ORAL
COMMUNITY
Start: 2019-11-23 | End: 2019-12-23

## 2019-12-23 RX ORDER — BUPROPION HYDROCHLORIDE 150 MG/1
150 TABLET ORAL EVERY MORNING
Qty: 90 TABLET | Refills: 3 | Status: SHIPPED | OUTPATIENT
Start: 2019-12-23 | End: 2021-02-08

## 2019-12-23 RX ORDER — MIRTAZAPINE 15 MG/1
TABLET, FILM COATED ORAL
COMMUNITY
Start: 2019-12-11 | End: 2019-12-23

## 2019-12-23 ASSESSMENT — ANXIETY QUESTIONNAIRES
3. WORRYING TOO MUCH ABOUT DIFFERENT THINGS: SEVERAL DAYS
5. BEING SO RESTLESS THAT IT IS HARD TO SIT STILL: SEVERAL DAYS
2. NOT BEING ABLE TO STOP OR CONTROL WORRYING: SEVERAL DAYS
GAD7 TOTAL SCORE: 6
1. FEELING NERVOUS, ANXIOUS, OR ON EDGE: SEVERAL DAYS
7. FEELING AFRAID AS IF SOMETHING AWFUL MIGHT HAPPEN: NOT AT ALL
6. BECOMING EASILY ANNOYED OR IRRITABLE: SEVERAL DAYS

## 2019-12-23 ASSESSMENT — PATIENT HEALTH QUESTIONNAIRE - PHQ9
SUM OF ALL RESPONSES TO PHQ QUESTIONS 1-9: 3
5. POOR APPETITE OR OVEREATING: SEVERAL DAYS

## 2019-12-23 ASSESSMENT — MIFFLIN-ST. JEOR: SCORE: 2063.74

## 2019-12-23 NOTE — NURSING NOTE
Chief Complaint   Patient presents with     Recheck Medication       Initial BP (!) 130/100   Pulse 88   Temp 98.3  F (36.8  C) (Tympanic)   Resp 20   Ht 1.829 m (6')   Wt 116.6 kg (257 lb)   SpO2 98%   BMI 34.86 kg/m   Estimated body mass index is 34.86 kg/m  as calculated from the following:    Height as of this encounter: 1.829 m (6').    Weight as of this encounter: 116.6 kg (257 lb).    Patient presents to the clinic using No DME    Health Maintenance that is potentially due pending provider review:  Colonoscopy/FIT and BP was high, used pink card, recheck manually. Flu shot today.    Possibly completing today per provider review.    Is there anyone who you would like to be able to receive your results? No  If yes have patient fill out TOLU    Melody Ojeda CMA([facility])

## 2019-12-23 NOTE — PROGRESS NOTES
Ruiz Son is a 49 year old male comes in today with the following concerns.      1. Hypertension. Does not monitor outside of clinic. Does not follow a low salt diet. No current concerns. Feels fine.     2. Depression and Anxiety. Feels that current regimen is working really good. Mood and sleep are stable.     Melody Ojeda CMA([facility])      S :Ruiz Son is a 49 year old male with f/u for:     Depression: wellbutrin and remeron.  Fills.  Stable    Htn; up a bit here, will recheck.  Says usually fine.    Elevated glucose: up on previous lab, 111.  Will get A1C baseline     No cp or sob    Problem list, med list, additional histories reviewed and updated, as indicated.      O:/89   Pulse 88   Temp 98.3  F (36.8  C) (Tympanic)   Resp 20   Ht 1.829 m (6')   Wt 116.6 kg (257 lb)   SpO2 98%   BMI 34.86 kg/m    GEN: Alert and oriented, in no acute distress  CV: RRR, no murmur  RESP: lungs clear bilaterally, good effort  EXT: no edema or lesions noted in lower extremities    A: htn, stable      Depression, stable    P: fills.  A1C.  Flu shot.      Doing well.  Back in year, earlier prn.

## 2019-12-24 ASSESSMENT — ANXIETY QUESTIONNAIRES: GAD7 TOTAL SCORE: 6

## 2020-11-16 ENCOUNTER — HEALTH MAINTENANCE LETTER (OUTPATIENT)
Age: 50
End: 2020-11-16

## 2020-12-29 DIAGNOSIS — I10 ESSENTIAL HYPERTENSION: ICD-10-CM

## 2020-12-29 DIAGNOSIS — F41.1 GENERALIZED ANXIETY DISORDER: ICD-10-CM

## 2020-12-29 DIAGNOSIS — F32.0 MILD MAJOR DEPRESSION (H): ICD-10-CM

## 2020-12-30 RX ORDER — MIRTAZAPINE 15 MG/1
15 TABLET, FILM COATED ORAL AT BEDTIME
Qty: 30 TABLET | Refills: 0 | Status: SHIPPED | OUTPATIENT
Start: 2020-12-30 | End: 2021-02-08

## 2020-12-30 RX ORDER — METOPROLOL SUCCINATE 50 MG/1
50 TABLET, EXTENDED RELEASE ORAL DAILY
Qty: 30 TABLET | Refills: 0 | Status: SHIPPED | OUTPATIENT
Start: 2020-12-30 | End: 2021-02-08

## 2021-01-18 ENCOUNTER — APPOINTMENT (OUTPATIENT)
Dept: OCCUPATIONAL MEDICINE | Facility: CLINIC | Age: 51
End: 2021-01-18

## 2021-01-18 PROCEDURE — 82075 ASSAY OF BREATH ETHANOL: CPT | Performed by: FAMILY MEDICINE

## 2021-01-18 PROCEDURE — 99000 SPECIMEN HANDLING OFFICE-LAB: CPT | Performed by: FAMILY MEDICINE

## 2021-02-08 ENCOUNTER — OFFICE VISIT (OUTPATIENT)
Dept: FAMILY MEDICINE | Facility: CLINIC | Age: 51
End: 2021-02-08
Payer: COMMERCIAL

## 2021-02-08 VITALS
RESPIRATION RATE: 16 BRPM | HEIGHT: 72 IN | HEART RATE: 83 BPM | SYSTOLIC BLOOD PRESSURE: 138 MMHG | DIASTOLIC BLOOD PRESSURE: 88 MMHG | WEIGHT: 238 LBS | OXYGEN SATURATION: 99 % | TEMPERATURE: 97.7 F | BODY MASS INDEX: 32.23 KG/M2

## 2021-02-08 DIAGNOSIS — F32.0 MILD MAJOR DEPRESSION (H): ICD-10-CM

## 2021-02-08 DIAGNOSIS — F41.1 GENERALIZED ANXIETY DISORDER: ICD-10-CM

## 2021-02-08 DIAGNOSIS — I10 ESSENTIAL HYPERTENSION: ICD-10-CM

## 2021-02-08 PROCEDURE — 99213 OFFICE O/P EST LOW 20 MIN: CPT | Performed by: FAMILY MEDICINE

## 2021-02-08 RX ORDER — SERTRALINE HYDROCHLORIDE 100 MG/1
100 TABLET, FILM COATED ORAL DAILY
Qty: 90 TABLET | Refills: 3 | Status: SHIPPED | OUTPATIENT
Start: 2021-02-08 | End: 2021-02-19

## 2021-02-08 RX ORDER — MIRTAZAPINE 15 MG/1
15 TABLET, FILM COATED ORAL AT BEDTIME
Qty: 90 TABLET | Refills: 3 | Status: SHIPPED | OUTPATIENT
Start: 2021-02-08 | End: 2021-10-25

## 2021-02-08 RX ORDER — METOPROLOL SUCCINATE 50 MG/1
50 TABLET, EXTENDED RELEASE ORAL DAILY
Qty: 90 TABLET | Refills: 3 | Status: SHIPPED | OUTPATIENT
Start: 2021-02-08 | End: 2021-10-25

## 2021-02-08 ASSESSMENT — ENCOUNTER SYMPTOMS
DIARRHEA: 0
ABDOMINAL PAIN: 0
CONSTIPATION: 0
HEMATOCHEZIA: 0
COUGH: 0
HEMATURIA: 0
CHILLS: 0

## 2021-02-08 ASSESSMENT — ANXIETY QUESTIONNAIRES
5. BEING SO RESTLESS THAT IT IS HARD TO SIT STILL: SEVERAL DAYS
2. NOT BEING ABLE TO STOP OR CONTROL WORRYING: NEARLY EVERY DAY
6. BECOMING EASILY ANNOYED OR IRRITABLE: MORE THAN HALF THE DAYS
IF YOU CHECKED OFF ANY PROBLEMS ON THIS QUESTIONNAIRE, HOW DIFFICULT HAVE THESE PROBLEMS MADE IT FOR YOU TO DO YOUR WORK, TAKE CARE OF THINGS AT HOME, OR GET ALONG WITH OTHER PEOPLE: SOMEWHAT DIFFICULT
GAD7 TOTAL SCORE: 12
7. FEELING AFRAID AS IF SOMETHING AWFUL MIGHT HAPPEN: SEVERAL DAYS
3. WORRYING TOO MUCH ABOUT DIFFERENT THINGS: NEARLY EVERY DAY
1. FEELING NERVOUS, ANXIOUS, OR ON EDGE: SEVERAL DAYS

## 2021-02-08 ASSESSMENT — PATIENT HEALTH QUESTIONNAIRE - PHQ9
10. IF YOU CHECKED OFF ANY PROBLEMS, HOW DIFFICULT HAVE THESE PROBLEMS MADE IT FOR YOU TO DO YOUR WORK, TAKE CARE OF THINGS AT HOME, OR GET ALONG WITH OTHER PEOPLE: SOMEWHAT DIFFICULT
5. POOR APPETITE OR OVEREATING: SEVERAL DAYS
SUM OF ALL RESPONSES TO PHQ QUESTIONS 1-9: 11
SUM OF ALL RESPONSES TO PHQ QUESTIONS 1-9: 11

## 2021-02-08 ASSESSMENT — MIFFLIN-ST. JEOR: SCORE: 1972.56

## 2021-02-08 NOTE — PROGRESS NOTES
S: Ruiz Son is a 50 year old male with depression.  Stopped wellbutrin.  Add something else?  Doesn't want to stop night mirtazapine or beta blocker for htn    O:/88   Pulse 83   Temp 97.7  F (36.5  C) (Tympanic)   Resp 16   Ht 1.829 m (6')   Wt 108 kg (238 lb)   SpO2 99%   BMI 32.28 kg/m    GEN: Alert and oriented, in no acute distress    A: depression, worse       Htn, borderline    P: add zoloft.  Back in a few months.  Continue other meds.

## 2021-02-09 ASSESSMENT — ANXIETY QUESTIONNAIRES: GAD7 TOTAL SCORE: 12

## 2021-02-18 ENCOUNTER — MYC MEDICAL ADVICE (OUTPATIENT)
Dept: FAMILY MEDICINE | Facility: CLINIC | Age: 51
End: 2021-02-18

## 2021-02-18 DIAGNOSIS — F41.1 GENERALIZED ANXIETY DISORDER: Primary | ICD-10-CM

## 2021-03-26 ENCOUNTER — IMMUNIZATION (OUTPATIENT)
Dept: NURSING | Facility: CLINIC | Age: 51
End: 2021-03-26
Payer: COMMERCIAL

## 2021-03-26 PROCEDURE — 91300 PR COVID VAC PFIZER DIL RECON 30 MCG/0.3 ML IM: CPT

## 2021-03-26 PROCEDURE — 0001A PR COVID VAC PFIZER DIL RECON 30 MCG/0.3 ML IM: CPT

## 2021-04-16 ENCOUNTER — IMMUNIZATION (OUTPATIENT)
Dept: NURSING | Facility: CLINIC | Age: 51
End: 2021-04-16
Attending: FAMILY MEDICINE
Payer: COMMERCIAL

## 2021-04-16 PROCEDURE — 91300 PR COVID VAC PFIZER DIL RECON 30 MCG/0.3 ML IM: CPT

## 2021-04-16 PROCEDURE — 0002A PR COVID VAC PFIZER DIL RECON 30 MCG/0.3 ML IM: CPT

## 2021-06-12 DIAGNOSIS — F41.1 GENERALIZED ANXIETY DISORDER: ICD-10-CM

## 2021-06-30 ENCOUNTER — OFFICE VISIT (OUTPATIENT)
Dept: FAMILY MEDICINE | Facility: CLINIC | Age: 51
End: 2021-06-30
Payer: COMMERCIAL

## 2021-06-30 VITALS
HEART RATE: 95 BPM | SYSTOLIC BLOOD PRESSURE: 134 MMHG | RESPIRATION RATE: 16 BRPM | TEMPERATURE: 97.7 F | BODY MASS INDEX: 33.09 KG/M2 | OXYGEN SATURATION: 98 % | WEIGHT: 244 LBS | DIASTOLIC BLOOD PRESSURE: 80 MMHG

## 2021-06-30 DIAGNOSIS — I10 ESSENTIAL HYPERTENSION: ICD-10-CM

## 2021-06-30 DIAGNOSIS — F41.1 GENERALIZED ANXIETY DISORDER: Primary | ICD-10-CM

## 2021-06-30 DIAGNOSIS — Z12.11 COLON CANCER SCREENING: ICD-10-CM

## 2021-06-30 PROCEDURE — 99214 OFFICE O/P EST MOD 30 MIN: CPT | Performed by: FAMILY MEDICINE

## 2021-06-30 NOTE — PROGRESS NOTES
A: anxiety, improved      Htn, stable    P: fills.  No labs needed  Colonoscopy.  Back in 6mo          s :Ruiz Son is a 51 year old male with anxiety.  Better on prozac added to remeron    Htn: controlled on metoprolol.      No cp or sob    O;/80   Pulse 95   Temp 97.7  F (36.5  C) (Tympanic)   Resp 16   Wt 110.7 kg (244 lb)   SpO2 98%   BMI 33.09 kg/m    GEN: Alert and oriented, in no acute distress

## 2021-07-07 DIAGNOSIS — Z11.59 ENCOUNTER FOR SCREENING FOR OTHER VIRAL DISEASES: ICD-10-CM

## 2021-08-06 ENCOUNTER — ANESTHESIA EVENT (OUTPATIENT)
Dept: GASTROENTEROLOGY | Facility: CLINIC | Age: 51
End: 2021-08-06
Payer: COMMERCIAL

## 2021-08-06 ENCOUNTER — LAB (OUTPATIENT)
Dept: LAB | Facility: CLINIC | Age: 51
End: 2021-08-06
Payer: COMMERCIAL

## 2021-08-06 DIAGNOSIS — Z11.59 ENCOUNTER FOR SCREENING FOR OTHER VIRAL DISEASES: ICD-10-CM

## 2021-08-06 PROCEDURE — U0005 INFEC AGEN DETEC AMPLI PROBE: HCPCS

## 2021-08-06 PROCEDURE — U0003 INFECTIOUS AGENT DETECTION BY NUCLEIC ACID (DNA OR RNA); SEVERE ACUTE RESPIRATORY SYNDROME CORONAVIRUS 2 (SARS-COV-2) (CORONAVIRUS DISEASE [COVID-19]), AMPLIFIED PROBE TECHNIQUE, MAKING USE OF HIGH THROUGHPUT TECHNOLOGIES AS DESCRIBED BY CMS-2020-01-R: HCPCS

## 2021-08-06 NOTE — ANESTHESIA PREPROCEDURE EVALUATION
Anesthesia Pre-Procedure Evaluation    Patient: Ruiz Son   MRN: [id number] : 1970        Preoperative Diagnosis: Screen for colon cancer [Z12.11]   Procedure : Procedure(s):  COLONOSCOPY     No past medical history on file.   Past Surgical History:   Procedure Laterality Date     SURGICAL HISTORY OF -   age 15    RIGHT varicocelectomy      No Known Allergies   Social History     Tobacco Use     Smoking status: Never Smoker     Smokeless tobacco: Never Used   Substance Use Topics     Alcohol use: Yes     Alcohol/week: 0.0 standard drinks     Comment: very little/   1-2 xmonth      Wt Readings from Last 1 Encounters:   21 110.7 kg (244 lb)        Anesthesia Evaluation   Pt has had prior anesthetic.         ROS/MED HX  ENT/Pulmonary:  - neg pulmonary ROS     Neurologic:  - neg neurologic ROS     Cardiovascular:     (+) Dyslipidemia hypertension-----    METS/Exercise Tolerance:     Hematologic:  - neg hematologic  ROS     Musculoskeletal:  - neg musculoskeletal ROS     GI/Hepatic:  - neg GI/hepatic ROS     Renal/Genitourinary:  - neg Renal ROS     Endo:     (+) Obesity,     Psychiatric/Substance Use:     (+) psychiatric history anxiety and depression     Infectious Disease:  - neg infectious disease ROS     Malignancy:  - neg malignancy ROS     Other:  - neg other ROS          Physical Exam    Airway        Mallampati: II   TM distance: > 3 FB   Neck ROM: full   Mouth opening: > 3 cm    Respiratory Devices and Support         Dental     Comment: Poor dentition         Cardiovascular   cardiovascular exam normal          Pulmonary   pulmonary exam normal                OUTSIDE LABS:  CBC:   Lab Results   Component Value Date    WBC 8.4 2017    WBC 9.2 2017    HGB 13.4 2017    HGB 13.4 2017    HCT 38.5 (L) 2017    HCT 39.5 (L) 2017     2017     2017     BMP:   Lab Results   Component Value Date     2017     06/15/2016     POTASSIUM 3.2 (L) 03/22/2017    POTASSIUM 3.7 06/15/2016    CHLORIDE 100 03/22/2017    CHLORIDE 106 06/15/2016    CO2 28 03/22/2017    CO2 27 06/15/2016    BUN 13 03/22/2017    BUN 16 06/15/2016    CR 1.03 03/22/2017    CR 0.98 06/15/2016    GLC 83 03/22/2017    GLC 87 06/15/2016     COAGS: No results found for: PTT, INR, FIBR  POC: No results found for: BGM, HCG, HCGS  HEPATIC:   Lab Results   Component Value Date    ALBUMIN 2.6 (L) 03/22/2017    PROTTOTAL 7.3 03/22/2017    ALT 55 03/22/2017    AST 21 03/22/2017    ALKPHOS 177 (H) 03/22/2017    BILITOTAL 0.6 03/22/2017     OTHER:   Lab Results   Component Value Date    A1C 5.4 12/23/2019    ANTHONY 8.9 03/22/2017    TSH 2.10 05/12/2010    .0 (H) 03/27/2017    SED 69 (H) 03/27/2017       Anesthesia Plan    ASA Status:  3   NPO Status:  NPO Appropriate    Anesthesia Type: General.     - Airway: Native airway   Induction: Propofol.   Maintenance: TIVA.        Consents    Anesthesia Plan(s) and associated risks, benefits, and realistic alternatives discussed. Questions answered and patient/representative(s) expressed understanding.     - Discussed with:  Patient         Postoperative Care    Pain management: IV analgesics, Oral pain medications, Multi-modal analgesia.   PONV prophylaxis: Ondansetron (or other 5HT-3), Dexamethasone or Solumedrol     Comments:                KEILY Santos CRNA

## 2021-08-07 LAB — SARS-COV-2 RNA RESP QL NAA+PROBE: NEGATIVE

## 2021-08-09 ENCOUNTER — HOSPITAL ENCOUNTER (OUTPATIENT)
Facility: CLINIC | Age: 51
Discharge: HOME | End: 2021-08-09
Attending: SURGERY | Admitting: SURGERY
Payer: COMMERCIAL

## 2021-08-09 ENCOUNTER — ANESTHESIA (OUTPATIENT)
Dept: GASTROENTEROLOGY | Facility: CLINIC | Age: 51
End: 2021-08-09
Payer: COMMERCIAL

## 2021-08-09 VITALS
SYSTOLIC BLOOD PRESSURE: 139 MMHG | RESPIRATION RATE: 16 BRPM | HEART RATE: 94 BPM | TEMPERATURE: 98.1 F | BODY MASS INDEX: 33.05 KG/M2 | WEIGHT: 244 LBS | OXYGEN SATURATION: 94 % | DIASTOLIC BLOOD PRESSURE: 97 MMHG | HEIGHT: 72 IN

## 2021-08-09 LAB — COLONOSCOPY: NORMAL

## 2021-08-09 PROCEDURE — 45385 COLONOSCOPY W/LESION REMOVAL: CPT | Mod: PT | Performed by: SURGERY

## 2021-08-09 PROCEDURE — 250N000011 HC RX IP 250 OP 636: Performed by: NURSE ANESTHETIST, CERTIFIED REGISTERED

## 2021-08-09 PROCEDURE — 88305 TISSUE EXAM BY PATHOLOGIST: CPT | Mod: TC | Performed by: SURGERY

## 2021-08-09 PROCEDURE — 250N000009 HC RX 250: Performed by: SURGERY

## 2021-08-09 PROCEDURE — 250N000009 HC RX 250: Performed by: NURSE ANESTHETIST, CERTIFIED REGISTERED

## 2021-08-09 PROCEDURE — 258N000003 HC RX IP 258 OP 636: Performed by: SURGERY

## 2021-08-09 PROCEDURE — 370N000017 HC ANESTHESIA TECHNICAL FEE, PER MIN: Performed by: SURGERY

## 2021-08-09 RX ORDER — METOPROLOL TARTRATE 1 MG/ML
INJECTION, SOLUTION INTRAVENOUS PRN
Status: DISCONTINUED | OUTPATIENT
Start: 2021-08-09 | End: 2021-08-09

## 2021-08-09 RX ORDER — LIDOCAINE HYDROCHLORIDE 10 MG/ML
INJECTION, SOLUTION EPIDURAL; INFILTRATION; INTRACAUDAL; PERINEURAL PRN
Status: DISCONTINUED | OUTPATIENT
Start: 2021-08-09 | End: 2021-08-09

## 2021-08-09 RX ORDER — LIDOCAINE 40 MG/G
CREAM TOPICAL
Status: DISCONTINUED | OUTPATIENT
Start: 2021-08-09 | End: 2021-08-09 | Stop reason: HOSPADM

## 2021-08-09 RX ORDER — PROPOFOL 10 MG/ML
INJECTION, EMULSION INTRAVENOUS PRN
Status: DISCONTINUED | OUTPATIENT
Start: 2021-08-09 | End: 2021-08-09

## 2021-08-09 RX ORDER — PROPOFOL 10 MG/ML
INJECTION, EMULSION INTRAVENOUS CONTINUOUS PRN
Status: DISCONTINUED | OUTPATIENT
Start: 2021-08-09 | End: 2021-08-09

## 2021-08-09 RX ORDER — SODIUM CHLORIDE, SODIUM LACTATE, POTASSIUM CHLORIDE, CALCIUM CHLORIDE 600; 310; 30; 20 MG/100ML; MG/100ML; MG/100ML; MG/100ML
INJECTION, SOLUTION INTRAVENOUS CONTINUOUS
Status: DISCONTINUED | OUTPATIENT
Start: 2021-08-09 | End: 2021-08-09 | Stop reason: HOSPADM

## 2021-08-09 RX ORDER — GLYCOPYRROLATE 0.2 MG/ML
INJECTION, SOLUTION INTRAMUSCULAR; INTRAVENOUS PRN
Status: DISCONTINUED | OUTPATIENT
Start: 2021-08-09 | End: 2021-08-09

## 2021-08-09 RX ADMIN — GLYCOPYRROLATE 0.2 MG: 0.2 INJECTION, SOLUTION INTRAMUSCULAR; INTRAVENOUS at 12:41

## 2021-08-09 RX ADMIN — SODIUM CHLORIDE, POTASSIUM CHLORIDE, SODIUM LACTATE AND CALCIUM CHLORIDE: 600; 310; 30; 20 INJECTION, SOLUTION INTRAVENOUS at 12:23

## 2021-08-09 RX ADMIN — METOPROLOL TARTRATE 5 MG: 5 INJECTION INTRAVENOUS at 12:44

## 2021-08-09 RX ADMIN — LIDOCAINE HYDROCHLORIDE 50 MG: 10 INJECTION, SOLUTION EPIDURAL; INFILTRATION; INTRACAUDAL; PERINEURAL at 12:41

## 2021-08-09 RX ADMIN — LIDOCAINE HYDROCHLORIDE 0.1 ML: 10 INJECTION, SOLUTION EPIDURAL; INFILTRATION; INTRACAUDAL; PERINEURAL at 12:23

## 2021-08-09 RX ADMIN — PROPOFOL 200 MCG/KG/MIN: 10 INJECTION, EMULSION INTRAVENOUS at 12:41

## 2021-08-09 RX ADMIN — PROPOFOL 50 MG: 10 INJECTION, EMULSION INTRAVENOUS at 12:41

## 2021-08-09 ASSESSMENT — MIFFLIN-ST. JEOR: SCORE: 1994.78

## 2021-08-09 NOTE — LETTER
Ruiz Son  [id number] [facility] [id number]      August 13, 2021    Dear Ruiz,  This letter is written to inform you of the results of your recent colonoscopy.  Your examination showed polyp(s) in your transverse colon and descending colon. All polyps were removed in their entirety and sent for review by a pathologist. As you will see on the pathology report below, the tissue(s) were tubular adenomatous polyps. Your examination was otherwise without abnormality.    Final Diagnosis   A: Large intestine, transverse, polyp, biopsy/polypectomy:  - Tubular adenoma negative for high-grade dysplasia.     B: Large intestine, descending, polyp, biopsy/polypectomy:  - Tubular adenoma negative for high-grade dysplasia.         Adenomatous polyps are entirely benign (non-cancerous); however, patients who have developed these polyps are at an increased risk for developing additional polyps in the future. If these are not eventually removed, there is a risk of developing colon cancer. We will advise more frequent examinations with you because of the risk associated with this type of polyp.    Given these findings,  I recommend that you undergo a repeat colonoscopy in 5 year(s) for surveillance. We will enter you into a recall system so you receive a reminder closer to the time that you are due for repeat examination.     Please remember that this recommendation is made with the understanding that you are not experiencing persistent changes in bowel function, bleeding per rectum, and/or significant abdominal pain. If you experience these symptoms, please contact your primary care provider for a further evaluation.     If you have any questions or concerns about the results of your colonoscopy or the appropriate follow-up, please contact my assistant at [phone]    Sincerely,        [facility]  [facility]  ___

## 2021-08-09 NOTE — ANESTHESIA CARE TRANSFER NOTE
Patient: Ruiz Son    Procedure(s):  COLONOSCOPY, FLEXIBLE, WITH LESION REMOVAL USING SNARE    Diagnosis: Screen for colon cancer [Z12.11]  Diagnosis Additional Information: No value filed.    Anesthesia Type:   General     Note:    Oropharynx: spontaneously breathing  Level of Consciousness: drowsy  Oxygen Supplementation: room air    Independent Airway: airway patency satisfactory and stable  Dentition: dentition unchanged  Vital Signs Stable: post-procedure vital signs reviewed and stable  Report to RN Given: handoff report given  Patient transferred to: Phase II    Handoff Report: Identifed the Patient, Identified the Reponsible Provider, Reviewed the pertinent medical history, Discussed the surgical course, Reviewed Intra-OP anesthesia mangement and issues during anesthesia, Set expectations for post-procedure period and Allowed opportunity for questions and acknowledgement of understanding      Vitals:  Vitals Value Taken Time   BP     Temp     Pulse     Resp     SpO2         Electronically Signed By: KEILY Quinones CRNA  August 9, 2021  1:00 PM

## 2021-08-09 NOTE — ANESTHESIA POSTPROCEDURE EVALUATION
Patient: Ruiz Son    Procedure(s):  COLONOSCOPY, FLEXIBLE, WITH LESION REMOVAL USING SNARE    Diagnosis:Screen for colon cancer [Z12.11]  Diagnosis Additional Information: No value filed.    Anesthesia Type:  General    Note:  Disposition: Outpatient   Postop Pain Control: Uneventful            Sign Out: Well controlled pain   PONV: No   Neuro/Psych: Uneventful            Sign Out: Acceptable/Baseline neuro status   Airway/Respiratory: Uneventful            Sign Out: Acceptable/Baseline resp. status   CV/Hemodynamics: Uneventful            Sign Out: Acceptable CV status   Other NRE: NONE   DID A NON-ROUTINE EVENT OCCUR? No           Last vitals:  Vitals Value Taken Time   BP     Temp     Pulse     Resp     SpO2         Electronically Signed By: KEILY Quinones CRNA  August 9, 2021  1:01 PM

## 2021-08-09 NOTE — H&P
[facility]    Pre-Endoscopy History and Physical     Ruiz Son MRN# [id number]   YOB: 1970 Age: 51 year old     Date of Procedure: 8/9/2021  Primary care provider: David Gordon  Type of Endoscopy: Colonoscopy with possible biopsy, possible polypectomy  Reason for Procedure: screening  Type of Anesthesia Anticipated: MAC    HPI:    Ruiz is a 51 year old male who will be undergoing the above procedure.  1st screening colonoscopy; no famhx of colon cancer; +famhx of esophageal cancer in father; no anticoagulation    A history and physical has been performed. The patient's medications and allergies have been reviewed. The risks and benefits of the procedure and the sedation options and risks were discussed with the patient.  All questions were answered and informed consent was obtained.      He denies a personal or family history of anesthesia complications or bleeding disorders.     Patient Active Problem List   Diagnosis     Essential hypertension     Generalized anxiety disorder     HYPERLIPIDEMIA LDL GOAL <130     Mild major depression (H)     Elevated glucose        History reviewed. No pertinent past medical history.     Past Surgical History:   Procedure Laterality Date     SURGICAL HISTORY OF -   age 15    RIGHT varicocelectomy       Social History     Tobacco Use     Smoking status: Never Smoker     Smokeless tobacco: Never Used   Substance Use Topics     Alcohol use: Yes     Alcohol/week: 0.0 standard drinks     Comment: very little/   1-2 xmonth       Family History   Problem Relation Age of Onset     Hypertension Mother      Cancer Father         esophageal     Hypertension Brother      Prostate Cancer Other         paternal uncle     Cancer - colorectal No family hx of      C.A.D. No family hx of        Prior to Admission medications    Medication Sig Start Date End Date Taking? Authorizing Provider   FLUoxetine (PROZAC) 20 MG capsule Take 1 capsule (20 mg) by  mouth daily 6/30/21  Yes David Gordon MD   metoprolol succinate ER (TOPROL-XL) 50 MG 24 hr tablet Take 1 tablet (50 mg) by mouth daily 2/8/21  Yes David Gordon MD   mirtazapine (REMERON) 15 MG tablet Take 1 tablet (15 mg) by mouth At Bedtime 2/8/21  Yes David Gordon MD       No Known Allergies     REVIEW OF SYSTEMS:   5 point ROS negative except as noted above in HPI, including Gen., Resp., CV, GI &  system review.    PHYSICAL EXAM:   BP (!) 143/105   Pulse 116   Temp 98.1  F (36.7  C) (Oral)   Ht 1.829 m (6')   Wt 110.7 kg (244 lb)   SpO2 98%   BMI 33.09 kg/m   Estimated body mass index is 33.09 kg/m  as calculated from the following:    Height as of this encounter: 1.829 m (6').    Weight as of this encounter: 110.7 kg (244 lb).   Constitutional: Awake, alert, no acute distress.  Eyes: No scleral icterus.  Conjunctiva are without injection.  ENMT: Mucous membranes moist, dentition and gums are intact.   Neck: Soft, supple, trachea midline.    Endocrine: n/a   Lymphatic: There is no cervical, submandibularadenopathy.  Respiratory: normal effortgs   Cardiovascular: S1, S2  Abdomen: Non-distended, non-tender,  No masses,  Musculoskeletal: No spinal or CVA tenderness. Full range of motion in the upper and lower extremities.    Skin: No skin rashes or lesions to inspection.  No petechia.    Neurologic: alerted and oriented 3x  Psychiatric: The patient's affect is not blunted and mood is appropriate.  DIAGNOSTICS:    Not indicated    IMPRESSION   ASA Class 2 - Mild systemic disease    PLAN:   Plan for Colonoscopy with possible biopsy, possible polypectomy. We discussed the risks, benefits and alternatives and the patient wished to proceed.  Patient is cleared for the above procedure.    The above has been forwarded to the consulting provider.    [facility], [facility]

## 2021-08-11 LAB
PATH REPORT.COMMENTS IMP SPEC: NORMAL
PATH REPORT.COMMENTS IMP SPEC: NORMAL
PATH REPORT.FINAL DX SPEC: NORMAL
PATH REPORT.GROSS SPEC: NORMAL
PATH REPORT.MICROSCOPIC SPEC OTHER STN: NORMAL
PHOTO IMAGE: NORMAL

## 2021-08-11 PROCEDURE — 88305 TISSUE EXAM BY PATHOLOGIST: CPT | Mod: 26 | Performed by: PATHOLOGY

## 2021-09-18 ENCOUNTER — HEALTH MAINTENANCE LETTER (OUTPATIENT)
Age: 51
End: 2021-09-18

## 2021-10-21 DIAGNOSIS — F41.1 GENERALIZED ANXIETY DISORDER: ICD-10-CM

## 2021-10-21 DIAGNOSIS — F32.0 MILD MAJOR DEPRESSION (H): ICD-10-CM

## 2021-10-21 DIAGNOSIS — I10 ESSENTIAL HYPERTENSION: ICD-10-CM

## 2021-10-22 NOTE — TELEPHONE ENCOUNTER
"Last office visit: 6/30/2021 with prescribing provider: Due for F/U in December per OV note.  PHQ 12/23/2019 2/8/2021 2/8/2021   PHQ-9 Total Score 3 11 7   Q9: Thoughts of better off dead/self-harm past 2 weeks Not at all Several days Not at all   F/U: Thoughts of suicide or self-harm - No -   F/U: Safety concerns - No -       Requested Prescriptions   Pending Prescriptions Disp Refills    FLUoxetine (PROZAC) 20 MG capsule 90 capsule 2     Sig: Take 1 capsule (20 mg) by mouth daily        SSRIs Protocol Failed - 10/21/2021  9:20 AM        Failed - PHQ-9 score less than 5 in past 6 months     Please review last PHQ-9 score.           Passed - Medication is active on med list        Passed - Patient is age 18 or older        Passed - Recent (6 mo) or future (30 days) visit within the authorizing provider's specialty     Patient had office visit in the last 6 months or has a visit in the next 30 days with authorizing provider or within the authorizing provider's specialty.  See \"Patient Info\" tab in inbasket, or \"Choose Columns\" in Meds & Orders section of the refill encounter.               metoprolol succinate ER (TOPROL-XL) 50 MG 24 hr tablet 90 tablet 3     Sig: Take 1 tablet (50 mg) by mouth daily        Beta-Blockers Protocol Failed - 10/21/2021  9:20 AM        Failed - Blood pressure under 140/90 in past 12 months       BP Readings from Last 3 Encounters:   08/09/21 (!) 139/97   06/30/21 134/80   02/08/21 138/88                 Passed - Patient is age 6 or older        Passed - Recent (12 mo) or future (30 days) visit within the authorizing provider's specialty     Patient has had an office visit with the authorizing provider or a provider within the authorizing providers department within the previous 12 mos or has a future within next 30 days. See \"Patient Info\" tab in inbasket, or \"Choose Columns\" in Meds & Orders section of the refill encounter.              Passed - Medication is active on med list        " "   mirtazapine (REMERON) 15 MG tablet 90 tablet 3     Sig: Take 1 tablet (15 mg) by mouth At Bedtime        Atypical Antidepressants Protocol Failed - 10/21/2021  9:20 AM        Failed - Patient has PHQ-9 score less than 5 in past 6 months.     Please review last PHQ-9 score.           Passed - Medication active on med list        Passed - Patient is age 18 or older        Passed - Recent (6 mo) or future (30 days) visit within the authorizing provider's specialty     Patient had office visit in the last 6 months or has a visit in the next 30 days with authorizing provider or within the authorizing provider's specialty.  See \"Patient Info\" tab in inbasket, or \"Choose Columns\" in Meds & Orders section of the refill encounter.                Kari HACKETT RN              "

## 2021-10-25 RX ORDER — METOPROLOL SUCCINATE 50 MG/1
50 TABLET, EXTENDED RELEASE ORAL DAILY
Qty: 90 TABLET | Refills: 1 | Status: SHIPPED | OUTPATIENT
Start: 2021-10-25 | End: 2022-02-28

## 2021-10-25 RX ORDER — MIRTAZAPINE 15 MG/1
15 TABLET, FILM COATED ORAL AT BEDTIME
Qty: 90 TABLET | Refills: 1 | Status: SHIPPED | OUTPATIENT
Start: 2021-10-25 | End: 2022-02-28

## 2022-01-08 ENCOUNTER — HEALTH MAINTENANCE LETTER (OUTPATIENT)
Age: 52
End: 2022-01-08

## 2022-01-10 ENCOUNTER — TELEPHONE (OUTPATIENT)
Dept: FAMILY MEDICINE | Facility: CLINIC | Age: 52
End: 2022-01-10
Payer: COMMERCIAL

## 2022-01-10 NOTE — TELEPHONE ENCOUNTER
Patient Quality Outreach    Patient is due for the following:   Depression  -  PHQ-9 Needed    NEXT STEPS:   No follow up needed at this time.    Type of outreach:    Sent [employer] message.      Questions for provider review:    None     Brianne Ragsdale MA

## 2022-01-26 ENCOUNTER — MEDICAL CORRESPONDENCE (OUTPATIENT)
Dept: HEALTH INFORMATION MANAGEMENT | Facility: CLINIC | Age: 52
End: 2022-01-26
Payer: COMMERCIAL

## 2022-02-28 ENCOUNTER — TELEPHONE (OUTPATIENT)
Dept: FAMILY MEDICINE | Facility: CLINIC | Age: 52
End: 2022-02-28
Payer: COMMERCIAL

## 2022-02-28 DIAGNOSIS — F32.0 MILD MAJOR DEPRESSION (H): ICD-10-CM

## 2022-02-28 DIAGNOSIS — I10 ESSENTIAL HYPERTENSION: ICD-10-CM

## 2022-02-28 DIAGNOSIS — F41.1 GENERALIZED ANXIETY DISORDER: ICD-10-CM

## 2022-02-28 RX ORDER — MIRTAZAPINE 15 MG/1
TABLET, FILM COATED ORAL
Qty: 60 TABLET | Refills: 0 | Status: SHIPPED | OUTPATIENT
Start: 2022-02-28 | End: 2022-05-19

## 2022-02-28 RX ORDER — METOPROLOL SUCCINATE 50 MG/1
TABLET, EXTENDED RELEASE ORAL
Qty: 60 TABLET | Refills: 0 | Status: SHIPPED | OUTPATIENT
Start: 2022-02-28 | End: 2022-03-01

## 2022-02-28 NOTE — TELEPHONE ENCOUNTER
Patient Quality Outreach    Patient is due for the following:   Office visit for anxiety   PHQ9    NEXT STEPS:   Schedule a office visit for anxiety     Type of outreach:    Phone, spoke to patient/parent. spoke with wife she will have patient call back clinic       Questions for provider review:    None     Brianne Ragsdale MA

## 2022-03-01 RX ORDER — METOPROLOL SUCCINATE 50 MG/1
50 TABLET, EXTENDED RELEASE ORAL DAILY
Qty: 7 TABLET | Refills: 0 | Status: SHIPPED | OUTPATIENT
Start: 2022-03-01 | End: 2022-03-16

## 2022-03-01 NOTE — TELEPHONE ENCOUNTER
BP Readings from Last 6 Encounters:   08/09/21 (!) 139/97   06/30/21 134/80   02/08/21 138/88   12/23/19 138/89   12/17/17 135/89   07/16/17 (!) 137/91    Spoke with pt, no home BP monitoring.  Advised RN visit for BP check- scheduled 03-04-22)  Refilled qty 7. Further refills based on BP reading.  OJSY Nelson RN

## 2022-03-15 DIAGNOSIS — I10 ESSENTIAL HYPERTENSION: ICD-10-CM

## 2022-03-15 DIAGNOSIS — F41.1 GENERALIZED ANXIETY DISORDER: ICD-10-CM

## 2022-03-16 RX ORDER — METOPROLOL SUCCINATE 50 MG/1
50 TABLET, EXTENDED RELEASE ORAL DAILY
Qty: 30 TABLET | Refills: 0 | Status: SHIPPED | OUTPATIENT
Start: 2022-03-16 | End: 2022-05-01

## 2022-05-01 ENCOUNTER — MYC REFILL (OUTPATIENT)
Dept: FAMILY MEDICINE | Facility: CLINIC | Age: 52
End: 2022-05-01
Payer: COMMERCIAL

## 2022-05-01 DIAGNOSIS — F41.1 GENERALIZED ANXIETY DISORDER: ICD-10-CM

## 2022-05-01 DIAGNOSIS — I10 ESSENTIAL HYPERTENSION: ICD-10-CM

## 2022-05-03 RX ORDER — METOPROLOL SUCCINATE 50 MG/1
50 TABLET, EXTENDED RELEASE ORAL DAILY
Qty: 90 TABLET | Refills: 0 | Status: SHIPPED | OUTPATIENT
Start: 2022-05-03 | End: 2022-05-19

## 2022-05-19 ENCOUNTER — OFFICE VISIT (OUTPATIENT)
Dept: FAMILY MEDICINE | Facility: CLINIC | Age: 52
End: 2022-05-19
Payer: COMMERCIAL

## 2022-05-19 VITALS
BODY MASS INDEX: 32.23 KG/M2 | DIASTOLIC BLOOD PRESSURE: 85 MMHG | TEMPERATURE: 97.8 F | SYSTOLIC BLOOD PRESSURE: 135 MMHG | HEART RATE: 82 BPM | RESPIRATION RATE: 16 BRPM | OXYGEN SATURATION: 94 % | WEIGHT: 238 LBS | HEIGHT: 72 IN

## 2022-05-19 DIAGNOSIS — Z13.220 SCREENING FOR HYPERLIPIDEMIA: ICD-10-CM

## 2022-05-19 DIAGNOSIS — Z11.59 NEED FOR HEPATITIS C SCREENING TEST: ICD-10-CM

## 2022-05-19 DIAGNOSIS — E78.2 MIXED HYPERLIPIDEMIA: ICD-10-CM

## 2022-05-19 DIAGNOSIS — I10 ESSENTIAL HYPERTENSION: ICD-10-CM

## 2022-05-19 DIAGNOSIS — Z11.4 SCREENING FOR HIV (HUMAN IMMUNODEFICIENCY VIRUS): ICD-10-CM

## 2022-05-19 DIAGNOSIS — F32.0 MILD MAJOR DEPRESSION (H): ICD-10-CM

## 2022-05-19 DIAGNOSIS — F41.1 GENERALIZED ANXIETY DISORDER: ICD-10-CM

## 2022-05-19 DIAGNOSIS — Z00.00 ROUTINE GENERAL MEDICAL EXAMINATION AT A HEALTH CARE FACILITY: Primary | ICD-10-CM

## 2022-05-19 DIAGNOSIS — N39.44 URINARY INCONTINENCE, NOCTURNAL ENURESIS: ICD-10-CM

## 2022-05-19 LAB
ALBUMIN UR-MCNC: NEGATIVE MG/DL
ANION GAP SERPL CALCULATED.3IONS-SCNC: 6 MMOL/L (ref 3–14)
APPEARANCE UR: CLEAR
BILIRUB UR QL STRIP: NEGATIVE
BUN SERPL-MCNC: 17 MG/DL (ref 7–30)
CALCIUM SERPL-MCNC: 8.6 MG/DL (ref 8.5–10.1)
CHLORIDE BLD-SCNC: 107 MMOL/L (ref 94–109)
CHOLEST SERPL-MCNC: 303 MG/DL
CO2 SERPL-SCNC: 28 MMOL/L (ref 20–32)
COLOR UR AUTO: YELLOW
CREAT SERPL-MCNC: 1.28 MG/DL (ref 0.66–1.25)
ERYTHROCYTE [DISTWIDTH] IN BLOOD BY AUTOMATED COUNT: 11.9 % (ref 10–15)
FASTING STATUS PATIENT QL REPORTED: NO
GFR SERPL CREATININE-BSD FRML MDRD: 67 ML/MIN/1.73M2
GLUCOSE BLD-MCNC: 94 MG/DL (ref 70–99)
GLUCOSE UR STRIP-MCNC: NEGATIVE MG/DL
HBA1C MFR BLD: 5.6 % (ref 0–5.6)
HCT VFR BLD AUTO: 44.4 % (ref 40–53)
HDLC SERPL-MCNC: 38 MG/DL
HGB BLD-MCNC: 14.9 G/DL (ref 13.3–17.7)
HGB UR QL STRIP: NEGATIVE
KETONES UR STRIP-MCNC: ABNORMAL MG/DL
LDLC SERPL CALC-MCNC: ABNORMAL MG/DL
LEUKOCYTE ESTERASE UR QL STRIP: NEGATIVE
MCH RBC QN AUTO: 31 PG (ref 26.5–33)
MCHC RBC AUTO-ENTMCNC: 33.6 G/DL (ref 31.5–36.5)
MCV RBC AUTO: 92 FL (ref 78–100)
NITRATE UR QL: NEGATIVE
NONHDLC SERPL-MCNC: 265 MG/DL
PH UR STRIP: 7 [PH] (ref 5–7)
PLATELET # BLD AUTO: 264 10E3/UL (ref 150–450)
POTASSIUM BLD-SCNC: 4.3 MMOL/L (ref 3.4–5.3)
RBC # BLD AUTO: 4.81 10E6/UL (ref 4.4–5.9)
SODIUM SERPL-SCNC: 141 MMOL/L (ref 133–144)
SP GR UR STRIP: 1.02 (ref 1–1.03)
TRIGL SERPL-MCNC: 429 MG/DL
UROBILINOGEN UR STRIP-ACNC: 1 E.U./DL
WBC # BLD AUTO: 6.8 10E3/UL (ref 4–11)

## 2022-05-19 PROCEDURE — 99214 OFFICE O/P EST MOD 30 MIN: CPT | Mod: 25 | Performed by: FAMILY MEDICINE

## 2022-05-19 PROCEDURE — 80061 LIPID PANEL: CPT | Performed by: FAMILY MEDICINE

## 2022-05-19 PROCEDURE — 99396 PREV VISIT EST AGE 40-64: CPT | Performed by: FAMILY MEDICINE

## 2022-05-19 PROCEDURE — 36415 COLL VENOUS BLD VENIPUNCTURE: CPT | Performed by: FAMILY MEDICINE

## 2022-05-19 PROCEDURE — 85027 COMPLETE CBC AUTOMATED: CPT | Performed by: FAMILY MEDICINE

## 2022-05-19 PROCEDURE — 81003 URINALYSIS AUTO W/O SCOPE: CPT | Performed by: FAMILY MEDICINE

## 2022-05-19 PROCEDURE — 80048 BASIC METABOLIC PNL TOTAL CA: CPT | Performed by: FAMILY MEDICINE

## 2022-05-19 PROCEDURE — 83036 HEMOGLOBIN GLYCOSYLATED A1C: CPT | Performed by: FAMILY MEDICINE

## 2022-05-19 RX ORDER — METOPROLOL SUCCINATE 50 MG/1
50 TABLET, EXTENDED RELEASE ORAL DAILY
Qty: 90 TABLET | Refills: 1 | Status: SHIPPED | OUTPATIENT
Start: 2022-05-19 | End: 2022-11-30

## 2022-05-19 RX ORDER — MIRTAZAPINE 15 MG/1
TABLET, FILM COATED ORAL
Qty: 90 TABLET | Refills: 1 | Status: SHIPPED | OUTPATIENT
Start: 2022-05-19 | End: 2022-08-22

## 2022-05-19 ASSESSMENT — ANXIETY QUESTIONNAIRES
GAD7 TOTAL SCORE: 5
5. BEING SO RESTLESS THAT IT IS HARD TO SIT STILL: NOT AT ALL
6. BECOMING EASILY ANNOYED OR IRRITABLE: SEVERAL DAYS
7. FEELING AFRAID AS IF SOMETHING AWFUL MIGHT HAPPEN: SEVERAL DAYS
3. WORRYING TOO MUCH ABOUT DIFFERENT THINGS: SEVERAL DAYS
2. NOT BEING ABLE TO STOP OR CONTROL WORRYING: SEVERAL DAYS
IF YOU CHECKED OFF ANY PROBLEMS ON THIS QUESTIONNAIRE, HOW DIFFICULT HAVE THESE PROBLEMS MADE IT FOR YOU TO DO YOUR WORK, TAKE CARE OF THINGS AT HOME, OR GET ALONG WITH OTHER PEOPLE: SOMEWHAT DIFFICULT
GAD7 TOTAL SCORE: 5
1. FEELING NERVOUS, ANXIOUS, OR ON EDGE: SEVERAL DAYS

## 2022-05-19 ASSESSMENT — PAIN SCALES - GENERAL: PAINLEVEL: NO PAIN (0)

## 2022-05-19 ASSESSMENT — PATIENT HEALTH QUESTIONNAIRE - PHQ9
SUM OF ALL RESPONSES TO PHQ QUESTIONS 1-9: 6
5. POOR APPETITE OR OVEREATING: NOT AT ALL

## 2022-05-19 NOTE — PROGRESS NOTES
SUBJECTIVE:   CC: Ruiz Son is an 51 year old male who presents for preventative health visit.       Patient has been advised of split billing requirements and indicates understanding: Yes    History of Present Illness       Reason for visit:  Med check check up    He eats 0-1 servings of fruits and vegetables daily.He consumes 1 sweetened beverage(s) daily.He exercises with enough effort to increase his heart rate 10 to 19 minutes per day.  He exercises with enough effort to increase his heart rate 3 or less days per week.   He is taking medications regularly.  Answers for HPI/ROS submitted by the patient on 5/19/2022  What is the reason for your visit today? : Med check check up  How many servings of fruits and vegetables do you eat daily?: 0-1  On average, how many sweetened beverages do you drink each day (Examples: soda, juice, sweet tea, etc.  Do NOT count diet or artificially sweetened beverages)?: 1  How many minutes a day do you exercise enough to make your heart beat faster?: 10 to 19  How many days a week do you exercise enough to make your heart beat faster?: 3 or less  How many days per week do you miss taking your medication?: 0        Today's PHQ-2 Score:   PHQ-2 ( 1999 [employer]) 2/8/2021   Q1: Little interest or pleasure in doing things 3   Q2: Feeling down, depressed or hopeless 3   PHQ-2 Score 6   PHQ-2 Total Score (12-17 Years)- Positive if 3 or more points; Administer PHQ-A if positive 6   Q1: Little interest or pleasure in doing things Nearly every day   Q2: Feeling down, depressed or hopeless Nearly every day   PHQ-2 Score 6       Abuse: Current or Past(Physical, Sexual or Emotional)- No  Do you feel safe in your environment? Yes    Have you ever done Advance Care Planning? (For example, a Health Directive, POLST, or a discussion with a medical provider or your loved ones about your wishes): No, advance care planning information given to patient to review.  Patient declined advance care  planning discussion at this time.    Social History     Tobacco Use     Smoking status: Never Smoker     Smokeless tobacco: Never Used   Substance Use Topics     Alcohol use: Yes     Alcohol/week: 0.0 standard drinks     Comment: very little/   1-2 xmonth     If you drink alcohol do you typically have >3 drinks per day or >7 drinks per week? No    Alcohol Use 2/8/2021   Prescreen: >3 drinks/day or >7 drinks/week? No   Prescreen: >3 drinks/day or >7 drinks/week? -   No flowsheet data found.    Last PSA:   PSA   Date Value Ref Range Status   06/15/2016 0.46 0 - 4 ug/L Final       Reviewed orders with patient. Reviewed health maintenance and updated orders accordingly - Yes  Lab work is in process  Labs reviewed in EPIC  BP Readings from Last 3 Encounters:   05/19/22 135/85   08/09/21 (!) 139/97   06/30/21 134/80    Wt Readings from Last 3 Encounters:   05/19/22 108 kg (238 lb)   08/09/21 110.7 kg (244 lb)   06/30/21 110.7 kg (244 lb)                  Patient Active Problem List   Diagnosis     Essential hypertension     Generalized anxiety disorder     HYPERLIPIDEMIA LDL GOAL <130     Mild major depression (H)     Elevated glucose     Past Surgical History:   Procedure Laterality Date     SURGICAL HISTORY OF -   age 15    RIGHT varicocelectomy       Social History     Tobacco Use     Smoking status: Never Smoker     Smokeless tobacco: Never Used   Substance Use Topics     Alcohol use: Yes     Alcohol/week: 0.0 standard drinks     Comment: very little/   1-2 xmonth     Family History   Problem Relation Age of Onset     Hypertension Mother      Cancer Father         esophageal     Hypertension Brother      Prostate Cancer Other         paternal uncle     Cancer - colorectal No family hx of      C.A.D. No family hx of          Current Outpatient Medications   Medication Sig Dispense Refill     FLUoxetine (PROZAC) 20 MG capsule TAKE 1 CAPSULE DAILY 30 capsule 0     metoprolol succinate ER (TOPROL-XL) 50 MG 24 hr tablet  "Take 1 tablet (50 mg) by mouth daily NEEDS BP CHECK 90 tablet 0     mirtazapine (REMERON) 15 MG tablet TAKE 1 TABLET(15 MG) BY MOUTH AT BEDTIME 60 tablet 0     No Known Allergies  Recent Labs   Lab Test 12/23/19  1747 03/22/17  1149 06/15/16  1553 06/15/15  1333 03/18/14  1427   A1C 5.4  --   --   --   --    LDL  --   --   --  147* 123   HDL  --   --   --  48 41   TRIG  --   --   --  158* 159*   ALT  --  55 42  --   --    CR  --  1.03 0.98  --   --    GFRESTIMATED  --  77 82  --   --    GFRESTBLACK  --  >90   GFR Calc   >90   GFR Calc    --   --    POTASSIUM  --  3.2* 3.7  --   --         Reviewed and updated as needed this visit by clinical staff   Tobacco  Allergies  Meds   Med Hx  Surg Hx  Fam Hx  Soc Hx          Reviewed and updated as needed this visit by Provider                     Review of Systems  CONSTITUTIONAL: NEGATIVE for fever, chills, change in weight  INTEGUMENTARY/SKIN: NEGATIVE for worrisome rashes, moles or lesions  EYES: NEGATIVE for vision changes or irritation  ENT: NEGATIVE for ear, mouth and throat problems  RESP: NEGATIVE for significant cough or SOB  CV: NEGATIVE for chest pain, palpitations or peripheral edema  GI: NEGATIVE for nausea, abdominal pain, heartburn, or change in bowel habits   male: incontinence  MUSCULOSKELETAL: NEGATIVE for significant arthralgias or myalgia  NEURO: NEGATIVE for weakness, dizziness or paresthesias  PSYCHIATRIC: NEGATIVE for changes in mood or affect      OBJECTIVE:   /85 (BP Location: Right arm, Patient Position: Sitting, Cuff Size: Adult Large)   Pulse 82   Temp 97.8  F (36.6  C) (Tympanic)   Resp 16   Ht 1.829 m (6' 0.01\")   Wt 108 kg (238 lb)   SpO2 94%   BMI 32.27 kg/m      Physical Exam  GENERAL: alert, no distress and obese  EYES: Eyes grossly normal to inspection, PERRL and conjunctivae and sclerae normal  HENT: normal cephalic/atraumatic, nose and mouth without ulcers or lesions, oropharynx clear " and oral mucous membranes moist  NECK: no adenopathy, no asymmetry, masses, or scars and thyroid normal to palpation  RESP: lungs clear to auscultation - no rales, rhonchi or wheezes  CV: regular rates and rhythm, normal S1 S2, no S3 or S4 and no murmur, click or rub  ABDOMEN: soft, nontender  MS: no gross musculoskeletal defects noted, no edema  SKIN: no suspicious lesions or rashes  NEURO: Normal strength and tone, sensory exam grossly normal and mentation intact  PSYCH: mentation appears normal, anxious, judgement and insight intact and appearance well groomed      Wt Readings from Last 10 Encounters:   05/19/22 108 kg (238 lb)   08/09/21 110.7 kg (244 lb)   06/30/21 110.7 kg (244 lb)   02/08/21 108 kg (238 lb)   12/23/19 116.6 kg (257 lb)   12/17/17 100.7 kg (222 lb)   05/02/17 102.5 kg (226 lb)   03/28/17 107 kg (236 lb)   03/27/17 104.8 kg (231 lb)   03/22/17 104.3 kg (230 lb)       ASSESSMENT/PLAN:   (Z00.00) Routine general medical examination at a health care facility  (primary encounter diagnosis)  Comment: Experiencing some nocturnal incontinence for few years, previous blood work-up including ultrasound abdomen was unremarkable.  Differential discussed in detail.  Recommend to limit caffeine intake and fluid intake in evening hours.  Urology referral placed for expert opinion. Healthy lifestyle modifications stressed including regular exercise, balanced diet, weight loss and limiting salt/caffeine/pop intake      (N39.44) Urinary incontinence, nocturnal enuresis  Comment: As above  Plan: Adult Urology Referral, CBC with platelets,         Basic metabolic panel  (Ca, Cl, CO2, Creat,         Gluc, K, Na, BUN), Hemoglobin A1c, UA Macro         with Reflex to Micro and Culture - lab collect,        CANCELED: UA macro with reflex to Microscopic         and Culture - Clinc Collect         (Z11.4) Screening for HIV (human immunodeficiency virus)  Comment: Low risk, patient declined HIV screening  Plan:  "CANCELED: HIV Antigen Antibody Combo            (Z11.59) Need for hepatitis C screening test  Comment: Low risk, patient declined hepatitis C screening  Plan: CANCELED: Hepatitis C Screen Reflex to HCV RNA         Quant and Genotype         (Z13.220) Screening for hyperlipidemia  Comment: Lipid panel ordered      (E78.2) Mixed hyperlipidemia  Comment: Healthy lifestyle modifications stressed including regular exercise, balanced diet, weight loss and limiting salt/caffeine/pop intake  Plan: Lipid panel            (F41.1) Generalized anxiety disorder  Comment: Continue mirtazapine  Plan: mirtazapine (REMERON) 15 MG tablet, FLUoxetine         (PROZAC) 20 MG capsule            (F32.0) Mild major depression (H)  Comment: Symptoms stable.  Continue mirtazapine and fluoxetine  Plan: mirtazapine (REMERON) 15 MG tablet            (I10) Essential hypertension  Comment: Blood pressure within target goal of less than 140/90.  Continue metoprolol  Plan: metoprolol succinate ER (TOPROL XL) 50 MG 24 hr        tablet         COUNSELING:   Reviewed preventive health counseling, as reflected in patient instructions    Estimated body mass index is 32.27 kg/m  as calculated from the following:    Height as of this encounter: 1.829 m (6' 0.01\").    Weight as of this encounter: 108 kg (238 lb).     Weight management plan: Discussed healthy diet and exercise guidelines    He reports that he has never smoked. He has never used smokeless tobacco.      Counseling Resources:  ATP IV Guidelines  Pooled Cohorts Equation Calculator  FRAX Risk Assessment  ICSI Preventive Guidelines  Dietary Guidelines for Americans, 2010  USDA's MyPlate  ASA Prophylaxis  Lung CA Screening    Mario Regan MD  [facility]  "

## 2022-05-24 ENCOUNTER — OFFICE VISIT (OUTPATIENT)
Dept: UROLOGY | Facility: CLINIC | Age: 52
End: 2022-05-24
Attending: FAMILY MEDICINE
Payer: COMMERCIAL

## 2022-05-24 VITALS — SYSTOLIC BLOOD PRESSURE: 152 MMHG | OXYGEN SATURATION: 96 % | DIASTOLIC BLOOD PRESSURE: 95 MMHG | HEART RATE: 71 BPM

## 2022-05-24 DIAGNOSIS — N39.44 URINARY INCONTINENCE, NOCTURNAL ENURESIS: ICD-10-CM

## 2022-05-24 PROCEDURE — 99203 OFFICE O/P NEW LOW 30 MIN: CPT | Mod: 25 | Performed by: STUDENT IN AN ORGANIZED HEALTH CARE EDUCATION/TRAINING PROGRAM

## 2022-05-24 PROCEDURE — 51798 US URINE CAPACITY MEASURE: CPT | Performed by: STUDENT IN AN ORGANIZED HEALTH CARE EDUCATION/TRAINING PROGRAM

## 2022-05-24 RX ORDER — TOLTERODINE 4 MG/1
4 CAPSULE, EXTENDED RELEASE ORAL DAILY
Qty: 90 CAPSULE | Refills: 2 | Status: CANCELLED | OUTPATIENT
Start: 2022-05-24

## 2022-05-24 RX ORDER — TOLTERODINE 2 MG/1
2 CAPSULE, EXTENDED RELEASE ORAL DAILY
Qty: 90 CAPSULE | Refills: 2 | Status: SHIPPED | OUTPATIENT
Start: 2022-05-24 | End: 2023-03-08

## 2022-05-24 NOTE — PROGRESS NOTES
Chief Complaint:   Urinary incontinence          History of Present Illness:   Ruiz Son is a 52 year old male with a history of major depressive disorder, TRISHA, HTN, and HLD who presents for evaluation of urinary incontinence.     Patient was seen by primary care on 5/19/2022 for routine medical visit.  He mentioned he has nocturnal enuresis. UA was unremarkable.     Today, he reports that he has had nocturnal enuresis for about a year.  He reports nocturia x 2 and states he will often wake up and his pants will be wet.  He denies daytime frequency, urgency, and incontinence.  He denies dysuria and gross hematuria.  He denies issue with his urinary stream and irregular bowel movements.  He does report nocturnal enuresis in childhood.    He drinks about 40 ounces of water and 3 to 4 cups of coffee daily.  He does not drink caffeine in the afternoon.    He did have a sleep study several years ago and wore CPAP at 1 point.         Past Medical History:   No past medical history on file.         Past Surgical History:     Past Surgical History:   Procedure Laterality Date     SURGICAL HISTORY OF -   age 15    RIGHT varicocelectomy            Medications     Current Outpatient Medications   Medication     FLUoxetine (PROZAC) 20 MG capsule     metoprolol succinate ER (TOPROL XL) 50 MG 24 hr tablet     mirtazapine (REMERON) 15 MG tablet     No current facility-administered medications for this visit.            Allergies:   Patient has no known allergies.         Review of Systems:  From intake questionnaire   Negative 14 system review except as noted on HPI, nurse's note.         Physical Exam:   Patient is a 52 year old  male   Vitals: Blood pressure (!) 152/95, pulse 71, SpO2 96 %.  General Appearance Adult: Alert, no acute distress, oriented.  Lungs: Non-labored breathing.  Heart: No obvious jugular venous distension present.  Neuro: Alert, oriented, speech and mentation normal    PVR: 11 mL      Labs and  Pathology:    I personally reviewed all applicable laboratory data and went over findings with patient  Significant for:    UA RESULTS:   Recent Labs   Lab Test 05/19/22  1649 06/15/16  1559   SG 1.020 1.025   URINEPH 7.0 5.5   NITRITE Negative Negative   RBCU  --  O - 2   WBCU  --  O - 2       PSA RESULTS  PSA   Date Value Ref Range Status   06/15/2016 0.46 0 - 4 ug/L Final            Assessment and Plan:     Assessment: 52 year old male with a 1 year history of nocturnal enuresis and nocturia x2.  He has no issues during the daytime.    We did discuss conservative measures for nighttime urination including stopping fluids before bedtime.  He does have a history of sleep apnea.  We discussed that people with sleep apnea tend to make more urine at night and this may be something to discuss with his primary care provider.    Plan:  1.  Start tolterodine ER 2 mg daily before bed.  Side effects reviewed.  Patient was advised that this medication can take several weeks to notice benefit. This medication is not effective, could increase dose of tolterodine or try another anticholinergic or beta 3 agonist.  2.  Consider conservative measures to prevent nighttime urination.  3.  Follow-up in 3 to 6 months.    BARBARA KING PA-C  Department of Urology

## 2022-05-24 NOTE — NURSING NOTE
"Initial BP (!) 152/95 (BP Location: Left arm, Patient Position: Chair, Cuff Size: Adult Large)   Pulse 71   SpO2 96%  Estimated body mass index is 32.27 kg/m  as calculated from the following:    Height as of 5/19/22: 1.829 m (6' 0.01\").    Weight as of 5/19/22: 108 kg (238 lb). .    Active order to obtain bladder scan? Yes   Name of ordering provider:  Mckenna Whipple  Bladder scan preformed post void Yes.  Bladder scan reveled 11ML  Provider notified?  Yes    Tressa Malone CMA          "

## 2022-06-13 ENCOUNTER — TELEPHONE (OUTPATIENT)
Dept: FAMILY MEDICINE | Facility: CLINIC | Age: 52
End: 2022-06-13
Payer: COMMERCIAL

## 2022-06-13 NOTE — TELEPHONE ENCOUNTER
Patient Quality Outreach    Patient is due for the following:   Hypertension -  BP check    NEXT STEPS:   none     Type of outreach:    Phone, spoke to patient/parent. he will call back and give the clinic BP readings       Questions for provider review:    None     Brianne Ragsdale MA

## 2022-07-25 ENCOUNTER — APPOINTMENT (OUTPATIENT)
Dept: OCCUPATIONAL MEDICINE | Facility: CLINIC | Age: 52
End: 2022-07-25

## 2022-07-25 PROCEDURE — 99000 SPECIMEN HANDLING OFFICE-LAB: CPT | Performed by: FAMILY MEDICINE

## 2022-08-19 DIAGNOSIS — F41.1 GENERALIZED ANXIETY DISORDER: ICD-10-CM

## 2022-08-19 DIAGNOSIS — F32.0 MILD MAJOR DEPRESSION (H): ICD-10-CM

## 2022-08-22 RX ORDER — MIRTAZAPINE 15 MG/1
TABLET, FILM COATED ORAL
Qty: 90 TABLET | Refills: 1 | Status: SHIPPED | OUTPATIENT
Start: 2022-08-22 | End: 2023-03-09

## 2022-09-06 DIAGNOSIS — F32.0 MILD MAJOR DEPRESSION (H): ICD-10-CM

## 2022-09-06 DIAGNOSIS — F41.1 GENERALIZED ANXIETY DISORDER: ICD-10-CM

## 2022-09-09 ASSESSMENT — ANXIETY QUESTIONNAIRES
2. NOT BEING ABLE TO STOP OR CONTROL WORRYING: SEVERAL DAYS
IF YOU CHECKED OFF ANY PROBLEMS ON THIS QUESTIONNAIRE, HOW DIFFICULT HAVE THESE PROBLEMS MADE IT FOR YOU TO DO YOUR WORK, TAKE CARE OF THINGS AT HOME, OR GET ALONG WITH OTHER PEOPLE: NOT DIFFICULT AT ALL
GAD7 TOTAL SCORE: 2
GAD7 TOTAL SCORE: 2
3. WORRYING TOO MUCH ABOUT DIFFERENT THINGS: NOT AT ALL
7. FEELING AFRAID AS IF SOMETHING AWFUL MIGHT HAPPEN: NOT AT ALL
1. FEELING NERVOUS, ANXIOUS, OR ON EDGE: SEVERAL DAYS
5. BEING SO RESTLESS THAT IT IS HARD TO SIT STILL: NOT AT ALL
6. BECOMING EASILY ANNOYED OR IRRITABLE: NOT AT ALL

## 2022-09-09 ASSESSMENT — PATIENT HEALTH QUESTIONNAIRE - PHQ9
SUM OF ALL RESPONSES TO PHQ QUESTIONS 1-9: 1
5. POOR APPETITE OR OVEREATING: NOT AT ALL

## 2022-09-09 NOTE — TELEPHONE ENCOUNTER
Called the Pt and was going to complete a PHQ-9 over the phone with him. No answer so asked him to please return our phone call.     Marily Mancuso RN

## 2022-09-09 NOTE — TELEPHONE ENCOUNTER
PHQ 3/16/2022 5/19/2022 9/9/2022   PHQ-9 Total Score 3 6 1   Q9: Thoughts of better off dead/self-harm past 2 weeks Not at all Not at all Not at all   F/U: Thoughts of suicide or self-harm - - -   F/U: Safety concerns - - -     TRISHA-7 SCORE 2/8/2021 5/19/2022 9/9/2022   Total Score 12 5 2     States feels fluoxetine effective, denies side effects.  Prescription approved per [facility] Refill Protocol.  JOSY Nelson RN

## 2022-11-20 ENCOUNTER — HEALTH MAINTENANCE LETTER (OUTPATIENT)
Age: 52
End: 2022-11-20

## 2022-11-29 DIAGNOSIS — I10 ESSENTIAL HYPERTENSION: ICD-10-CM

## 2022-11-30 RX ORDER — METOPROLOL SUCCINATE 50 MG/1
50 TABLET, EXTENDED RELEASE ORAL DAILY
Qty: 90 TABLET | Refills: 0 | Status: SHIPPED | OUTPATIENT
Start: 2022-11-30 | End: 2023-02-15

## 2023-02-13 DIAGNOSIS — I10 ESSENTIAL HYPERTENSION: ICD-10-CM

## 2023-02-15 RX ORDER — METOPROLOL SUCCINATE 50 MG/1
TABLET, EXTENDED RELEASE ORAL
Qty: 90 TABLET | Refills: 0 | Status: SHIPPED | OUTPATIENT
Start: 2023-02-15 | End: 2023-05-05

## 2023-03-08 DIAGNOSIS — N39.44 URINARY INCONTINENCE, NOCTURNAL ENURESIS: ICD-10-CM

## 2023-03-08 RX ORDER — TOLTERODINE 2 MG/1
2 CAPSULE, EXTENDED RELEASE ORAL DAILY
Qty: 90 CAPSULE | Refills: 1 | Status: SHIPPED | OUTPATIENT
Start: 2023-03-08 | End: 2023-06-05

## 2023-03-09 DIAGNOSIS — F41.1 GENERALIZED ANXIETY DISORDER: ICD-10-CM

## 2023-03-09 DIAGNOSIS — F32.0 MILD MAJOR DEPRESSION (H): ICD-10-CM

## 2023-03-09 RX ORDER — MIRTAZAPINE 15 MG/1
TABLET, FILM COATED ORAL
Qty: 90 TABLET | Refills: 0 | Status: SHIPPED | OUTPATIENT
Start: 2023-03-09 | End: 2023-06-05

## 2023-05-05 DIAGNOSIS — I10 ESSENTIAL HYPERTENSION: ICD-10-CM

## 2023-05-05 RX ORDER — METOPROLOL SUCCINATE 50 MG/1
TABLET, EXTENDED RELEASE ORAL
Qty: 30 TABLET | Refills: 0 | Status: SHIPPED | OUTPATIENT
Start: 2023-05-05 | End: 2023-06-05

## 2023-05-05 NOTE — TELEPHONE ENCOUNTER
Mary Kay refill given x 30 days. Spoke with patient, appointment scheduled with Dr. Gordon on 6/5/23.    Prescription approved per [facility] Refill Protocol.  Julie Behrendt RN

## 2023-06-05 ENCOUNTER — OFFICE VISIT (OUTPATIENT)
Dept: FAMILY MEDICINE | Facility: CLINIC | Age: 53
End: 2023-06-05
Payer: COMMERCIAL

## 2023-06-05 VITALS
OXYGEN SATURATION: 100 % | RESPIRATION RATE: 16 BRPM | HEIGHT: 73 IN | WEIGHT: 243 LBS | HEART RATE: 84 BPM | DIASTOLIC BLOOD PRESSURE: 88 MMHG | TEMPERATURE: 98.6 F | SYSTOLIC BLOOD PRESSURE: 132 MMHG | BODY MASS INDEX: 32.2 KG/M2

## 2023-06-05 DIAGNOSIS — F41.1 GENERALIZED ANXIETY DISORDER: ICD-10-CM

## 2023-06-05 DIAGNOSIS — I10 ESSENTIAL HYPERTENSION: ICD-10-CM

## 2023-06-05 DIAGNOSIS — F32.0 MILD MAJOR DEPRESSION (H): ICD-10-CM

## 2023-06-05 DIAGNOSIS — N39.44 URINARY INCONTINENCE, NOCTURNAL ENURESIS: ICD-10-CM

## 2023-06-05 PROCEDURE — 99214 OFFICE O/P EST MOD 30 MIN: CPT | Performed by: FAMILY MEDICINE

## 2023-06-05 RX ORDER — METOPROLOL SUCCINATE 50 MG/1
50 TABLET, EXTENDED RELEASE ORAL DAILY
Qty: 90 TABLET | Refills: 3 | Status: SHIPPED | OUTPATIENT
Start: 2023-06-05 | End: 2024-05-29

## 2023-06-05 RX ORDER — MIRTAZAPINE 15 MG/1
TABLET, FILM COATED ORAL
Qty: 90 TABLET | Refills: 3 | Status: SHIPPED | OUTPATIENT
Start: 2023-06-05 | End: 2024-06-03

## 2023-06-05 RX ORDER — TOLTERODINE 2 MG/1
2 CAPSULE, EXTENDED RELEASE ORAL DAILY
Qty: 90 CAPSULE | Refills: 3 | Status: SHIPPED | OUTPATIENT
Start: 2023-06-05 | End: 2024-07-01

## 2023-06-05 ASSESSMENT — PATIENT HEALTH QUESTIONNAIRE - PHQ9
SUM OF ALL RESPONSES TO PHQ QUESTIONS 1-9: 1
SUM OF ALL RESPONSES TO PHQ QUESTIONS 1-9: 1
10. IF YOU CHECKED OFF ANY PROBLEMS, HOW DIFFICULT HAVE THESE PROBLEMS MADE IT FOR YOU TO DO YOUR WORK, TAKE CARE OF THINGS AT HOME, OR GET ALONG WITH OTHER PEOPLE: NOT DIFFICULT AT ALL

## 2023-06-05 ASSESSMENT — PAIN SCALES - GENERAL: PAINLEVEL: NO PAIN (0)

## 2023-06-05 NOTE — NURSING NOTE
"Chief Complaint   Patient presents with     Lipids     Hypertension       Initial Temp 98.6  F (37  C) (Tympanic)   Resp 16   Ht 1.854 m (6' 1\")   Wt 110.2 kg (243 lb)   SpO2 100%   BMI 32.06 kg/m   Estimated body mass index is 32.06 kg/m  as calculated from the following:    Height as of this encounter: 1.854 m (6' 1\").    Weight as of this encounter: 110.2 kg (243 lb).    Patient presents to the clinic using No DME    Is there anyone who you would like to be able to receive your results? No  If yes have patient fill out TOLU    "

## 2023-06-05 NOTE — PROGRESS NOTES
"S: Ruiz Son is a 53 year old male with anxiety: better on prozac and remeron    Htn: controlled on metoprolol    Urinary urgency: detrol helps, per urology.      Depression: better on meds for anxiety    Current Outpatient Medications   Medication     FLUoxetine (PROZAC) 20 MG capsule     metoprolol succinate ER (TOPROL XL) 50 MG 24 hr tablet     mirtazapine (REMERON) 15 MG tablet     tolterodine ER (DETROL LA) 2 MG 24 hr capsule     No current facility-administered medications for this visit.        O:/88   Pulse 84   Temp 98.6  F (37  C) (Tympanic)   Resp 16   Ht 1.854 m (6' 1\")   Wt 110.2 kg (243 lb)   SpO2 100%   BMI 32.06 kg/m    GEN: Alert and oriented, in no acute distress  CV: RRR, no murmur  Mood normal    A:  anxiety: better on prozac and remeron  Htn: controlled on metoprolol  Urinary urgency: detrol helps, per urology.    Depression: better on meds for anxiety    P: fills on meds for chronic issues as above in med list and orders.    Doing well. Back in a year.      "

## 2023-07-02 ENCOUNTER — HEALTH MAINTENANCE LETTER (OUTPATIENT)
Age: 53
End: 2023-07-02

## 2023-10-30 ENCOUNTER — OFFICE VISIT (OUTPATIENT)
Dept: UROLOGY | Facility: CLINIC | Age: 53
End: 2023-10-30
Payer: COMMERCIAL

## 2023-10-30 VITALS
OXYGEN SATURATION: 99 % | RESPIRATION RATE: 18 BRPM | HEIGHT: 73 IN | WEIGHT: 249 LBS | DIASTOLIC BLOOD PRESSURE: 97 MMHG | SYSTOLIC BLOOD PRESSURE: 138 MMHG | BODY MASS INDEX: 33 KG/M2 | HEART RATE: 78 BPM

## 2023-10-30 DIAGNOSIS — N39.44 URINARY INCONTINENCE, NOCTURNAL ENURESIS: Primary | ICD-10-CM

## 2023-10-30 PROCEDURE — 99213 OFFICE O/P EST LOW 20 MIN: CPT | Performed by: STUDENT IN AN ORGANIZED HEALTH CARE EDUCATION/TRAINING PROGRAM

## 2023-10-30 NOTE — NURSING NOTE
Chief Complaint   Patient presents with    Nocturia     Urinating once throughout the night,frequency at night,urgency and odor-sx reported happens at night  Declined blood in urine,weak stream and emtpying bladder       Vitals:    10/30/23 1326   Resp: 18   Weight: 112.9 kg (249 lb)     Wt Readings from Last 1 Encounters:   10/30/23 112.9 kg (249 lb)     Lottie Mejia MA

## 2023-10-30 NOTE — PROGRESS NOTES
"    UROLOGY FOLLOW-UP NOTE          Chief Complaint:   Today I had the pleasure of seeing Mr. Ruiz Son in follow-up for a chief complaint of nocturnal enuresis.         Interval Update   Ruiz Son is a very pleasant 53 year old male with a history of HLD and HTN.     Brief History: Mr. Ruiz Son was last seen on 5/24/2022 for nocturnal enuresis. He was started on tolterodine 2 mg daily.     Today's notes: He thinks the tolterodine has been helpful, particularly with urinary urgency. In the last few weeks, he was experiencing nocturia and had a few episodes of enuresis. He recently stopped drinking fluids after 6 pm, and his symptoms have been much better.          Physical Exam:   Patient is a 53 year old  male   Vitals: Blood pressure (!) 138/97, pulse 78, resp. rate 18, height 1.854 m (6' 0.99\"), weight 112.9 kg (249 lb), SpO2 99%.  General: Alert and oriented x 3, no acute distress.  Respiratory: Non-labored breathing.  Cardiac: Regular rate.        Labs and Pathology:    I personally reviewed all applicable laboratory data and went over findings with patient  Significant for:    CBC RESULTS:  Recent Labs   Lab Test 05/19/22 1649 03/22/17  1149 03/16/17  1427 06/15/16  1553   WBC 6.8 8.4 9.2 6.9   HGB 14.9 13.4 13.4 14.6    381 237 254        BMP RESULTS:  Recent Labs   Lab Test 05/19/22 1649 03/22/17  1149 06/15/16  1553    136 139   POTASSIUM 4.3 3.2* 3.7   CHLORIDE 107 100 106   CO2 28 28 27   ANIONGAP 6 8 6   GLC 94 83 87   BUN 17 13 16   CR 1.28* 1.03 0.98   GFRESTIMATED 67 77 82   GFRESTBLACK  --  >90   GFR Calc   >90   GFR Calc     ANTHONY 8.6 8.9 8.7       UA RESULTS:   Recent Labs   Lab Test 05/19/22 1649 06/15/16  1559   SG 1.020 1.025   URINEPH 7.0 5.5   NITRITE Negative Negative   RBCU  --  O - 2   WBCU  --  O - 2       PSA RESULTS  PSA   Date Value Ref Range Status   06/15/2016 0.46 0 - 4 ug/L Final            Assessment/Plan   53 year " old male seen in follow up for nocturnal enuresis. He started tolterodine 2 mg daily last year. He thinks the tolterodine has been helpful, particularly with urinary urgency. In the last few weeks, he was experiencing nocturia and had a few episodes of enuresis. He recently stopped drinking fluids after 6 pm, and his symptoms have been much better.     We discussed conservative measures to reduce nocturia including reducing fluid intake 3-4 hours before bed, particularly bladder irritants, and salty snacks. The patient will continue with his current dose of tolterodine since his symptoms are improved. We could increase the dose to 4 mg if needed.     He asks about evaluation for low libido. We discussed checking a fasting AM testosterone level. The patient will talk this over with his wife and let me know if he would like to pursue this.     Plan:  Continue tolterodine 2 mg daily.   Follow up with urology as needed.              Past Medical History:   No past medical history on file.         Past Surgical History:     Past Surgical History:   Procedure Laterality Date    SURGICAL HISTORY OF -   age 15    RIGHT varicocelectomy            Medications     Current Outpatient Medications   Medication    FLUoxetine (PROZAC) 20 MG capsule    metoprolol succinate ER (TOPROL XL) 50 MG 24 hr tablet    mirtazapine (REMERON) 15 MG tablet    tolterodine ER (DETROL LA) 2 MG 24 hr capsule     No current facility-administered medications for this visit.            Family History:     Family History   Problem Relation Age of Onset    Hypertension Mother     Cancer Father         esophageal    Hypertension Brother     Prostate Cancer Other         paternal uncle    Cancer - colorectal No family hx of     C.A.D. No family hx of             Social History:     Social History     Socioeconomic History    Marital status:      Spouse name: Not on file    Number of children: Not on file    Years of education: Not on file    Highest  education level: Not on file   Occupational History    Not on file   Tobacco Use    Smoking status: Never    Smokeless tobacco: Never   Vaping Use    Vaping Use: Never used   Substance and Sexual Activity    Alcohol use: Yes     Alcohol/week: 0.0 standard drinks of alcohol     Comment: very little/   1-2 xmonth    Drug use: No    Sexual activity: Yes   Other Topics Concern    Parent/sibling w/ CABG, MI or angioplasty before 65F 55M? No   Social History Narrative    Not on file     Social Determinants of Health     Financial Resource Strain: Not on file   Food Insecurity: Not on file   Transportation Needs: Not on file   Physical Activity: Not on file   Stress: Not on file   Social Connections: Not on file   Interpersonal Safety: Not on file   Housing Stability: Not on file            Allergies:   Patient has no known allergies.         Review of Systems:  From intake questionnaire   Negative 14 system review except as noted on HPI, nurse's note.        BARBARA KING PA-C  Department of Urology

## 2024-05-29 DIAGNOSIS — I10 ESSENTIAL HYPERTENSION: ICD-10-CM

## 2024-05-29 RX ORDER — METOPROLOL SUCCINATE 50 MG/1
50 TABLET, EXTENDED RELEASE ORAL DAILY
Qty: 90 TABLET | Refills: 0 | Status: SHIPPED | OUTPATIENT
Start: 2024-05-29 | End: 2024-07-31 | Stop reason: DRUGHIGH

## 2024-06-03 DIAGNOSIS — F41.1 GENERALIZED ANXIETY DISORDER: ICD-10-CM

## 2024-06-03 DIAGNOSIS — F32.0 MILD MAJOR DEPRESSION (H): ICD-10-CM

## 2024-06-03 RX ORDER — MIRTAZAPINE 15 MG/1
TABLET, FILM COATED ORAL
Qty: 30 TABLET | Refills: 0 | Status: SHIPPED | OUTPATIENT
Start: 2024-06-03 | End: 2024-07-01

## 2024-06-03 NOTE — TELEPHONE ENCOUNTER
"Requested Prescriptions   Pending Prescriptions Disp Refills    FLUoxetine (PROZAC) 20 MG capsule [Pharmacy Med Name: FLUOXETINE 20MG CAPSULES] 90 capsule 3     Sig: TAKE 1 CAPSULE(20 MG) BY MOUTH DAILY       SSRIs Protocol Failed - 6/3/2024 10:23 AM        Failed - PHQ-9 score less than 5 in past 6 months     Please review last PHQ-9 score.           Failed - Recent (6 mo) or future (30 days) visit within the authorizing provider's specialty     Patient had office visit in the last 6 months or has a visit in the next 30 days with authorizing provider or within the authorizing provider's specialty.  See \"Patient Info\" tab in inbasket, or \"Choose Columns\" in Meds & Orders section of the refill encounter.            Passed - Medication is active on med list        Passed - Patient is age 18 or older          mirtazapine (REMERON) 15 MG tablet [Pharmacy Med Name: MIRTAZAPINE 15MG TABLETS] 90 tablet 3     Sig: TAKE 1 TABLET BY MOUTH AT BEDTIME       Atypical Antidepressants Protocol Failed - 6/3/2024 10:23 AM        Failed - Patient has PHQ-9 score less than 5 in past 6 months.     Please review last PHQ-9 score.           Failed - Recent (6 mo) or future (30 days) visit within the authorizing provider's specialty     Patient had office visit in the last 6 months or has a visit in the next 30 days with authorizing provider or within the authorizing provider's specialty.  See \"Patient Info\" tab in inbasket, or \"Choose Columns\" in Meds & Orders section of the refill encounter.            Passed - Medication active on med list        Passed - Patient is age 18 or older             "

## 2024-06-28 DIAGNOSIS — N39.44 URINARY INCONTINENCE, NOCTURNAL ENURESIS: ICD-10-CM

## 2024-06-30 DIAGNOSIS — F32.0 MILD MAJOR DEPRESSION (H): ICD-10-CM

## 2024-06-30 DIAGNOSIS — F41.1 GENERALIZED ANXIETY DISORDER: ICD-10-CM

## 2024-07-01 DIAGNOSIS — F32.0 MILD MAJOR DEPRESSION (H): ICD-10-CM

## 2024-07-01 DIAGNOSIS — F41.1 GENERALIZED ANXIETY DISORDER: ICD-10-CM

## 2024-07-01 RX ORDER — TOLTERODINE 2 MG/1
CAPSULE, EXTENDED RELEASE ORAL
Qty: 30 CAPSULE | Refills: 1 | Status: SHIPPED | OUTPATIENT
Start: 2024-07-01 | End: 2024-07-18

## 2024-07-01 RX ORDER — MIRTAZAPINE 15 MG/1
TABLET, FILM COATED ORAL
Qty: 30 TABLET | Refills: 0 | Status: SHIPPED | OUTPATIENT
Start: 2024-07-01 | End: 2024-07-31

## 2024-07-01 NOTE — TELEPHONE ENCOUNTER
Has appointment scheduled for 7/31/24.      Requested Prescriptions   Pending Prescriptions Disp Refills    mirtazapine (REMERON) 15 MG tablet [Pharmacy Med Name: MIRTAZAPINE 15MG TABLETS] 30 tablet 0     Sig: TAKE 1 TABLET BY MOUTH AT BEDTIME       Atypical Antidepressants Protocol Failed - 7/1/2024 10:35 AM        Failed - Patient has PHQ-9 score less than 5 in past 6 months.     Please review last PHQ-9 score.           Failed - TRISHA-7 score of less than 5 in past 6 months.     Please review last TRISHA-7 score.           Failed - Recent (12 mo) or future (90 days) visit within the authorizing provider's specialty     The patient must have completed an in-person or virtual visit within the past 12 months or has a future visit scheduled within the next 90 days with the authorizing provider s specialty.  Urgent care and e-visits do not quality as an office visit for this protocol.          Passed - Medication active on med list        Passed - Medication indicated for associated diagnosis     Medication is associated with one or more of the following diagnoses:     Anxiety   Depression   Panic disorder          Passed - Patient is age 18 or older

## 2024-07-03 ENCOUNTER — OFFICE VISIT (OUTPATIENT)
Dept: URGENT CARE | Facility: URGENT CARE | Age: 54
End: 2024-07-03
Payer: COMMERCIAL

## 2024-07-03 ENCOUNTER — NURSE TRIAGE (OUTPATIENT)
Dept: NURSING | Facility: CLINIC | Age: 54
End: 2024-07-03

## 2024-07-03 VITALS
SYSTOLIC BLOOD PRESSURE: 142 MMHG | WEIGHT: 240 LBS | TEMPERATURE: 98.1 F | OXYGEN SATURATION: 99 % | DIASTOLIC BLOOD PRESSURE: 97 MMHG | HEART RATE: 77 BPM | RESPIRATION RATE: 16 BRPM | BODY MASS INDEX: 31.67 KG/M2

## 2024-07-03 DIAGNOSIS — R35.0 FREQUENCY OF URINATION: Primary | ICD-10-CM

## 2024-07-03 DIAGNOSIS — N39.44 NOCTURNAL ENURESIS: ICD-10-CM

## 2024-07-03 LAB
ALBUMIN UR-MCNC: NEGATIVE MG/DL
APPEARANCE UR: CLEAR
BACTERIA #/AREA URNS HPF: ABNORMAL /HPF
BILIRUB UR QL STRIP: NEGATIVE
COLOR UR AUTO: YELLOW
GLUCOSE UR STRIP-MCNC: NEGATIVE MG/DL
HGB UR QL STRIP: NEGATIVE
KETONES UR STRIP-MCNC: NEGATIVE MG/DL
LEUKOCYTE ESTERASE UR QL STRIP: ABNORMAL
MUCOUS THREADS #/AREA URNS LPF: PRESENT /LPF
NITRATE UR QL: NEGATIVE
PH UR STRIP: 6 [PH] (ref 5–7)
RBC #/AREA URNS AUTO: ABNORMAL /HPF
SP GR UR STRIP: 1.02 (ref 1–1.03)
SQUAMOUS #/AREA URNS AUTO: ABNORMAL /LPF
UROBILINOGEN UR STRIP-ACNC: 0.2 E.U./DL
WBC #/AREA URNS AUTO: ABNORMAL /HPF

## 2024-07-03 PROCEDURE — 87563 M. GENITALIUM AMP PROBE: CPT | Mod: 90

## 2024-07-03 PROCEDURE — 87086 URINE CULTURE/COLONY COUNT: CPT

## 2024-07-03 PROCEDURE — 99213 OFFICE O/P EST LOW 20 MIN: CPT

## 2024-07-03 PROCEDURE — 99000 SPECIMEN HANDLING OFFICE-LAB: CPT

## 2024-07-03 PROCEDURE — 81001 URINALYSIS AUTO W/SCOPE: CPT

## 2024-07-03 PROCEDURE — 87798 DETECT AGENT NOS DNA AMP: CPT | Mod: 90

## 2024-07-03 NOTE — PROGRESS NOTES
URGENT CARE  Assessment & Plan   Assessment:   Ruiz Son is a 54 year old male who's clinical presentation today is consistent with:   1. Frequency of urination  - UA Macroscopic with reflex to Microscopic and Culture - Clinic Collect  - Urogenital Ureaplasma and Mycoplasma Species by PCR; Future  - Adult Urology  Referral; Future  Plan:  Urine analysis was not consistent with UTI today but will still send for culture and call patient with results as soon as they become available, will also assess for Ureaplasma and mycoplasma today given irritative voiding symptoms/urethritis, discussed with patient those results will also not come back for the next 1 to 2 days and we will call when resulted.   If culture and mycoplasma are still negative recommend patient follow-up with urology for further evaluation and treatment, referral placed.  Electronic medical record does show patient was seen for nocturnal enuresis in October 2023, placed on Detrol for overactive bladder; patient states he has not been followed up since then.  Patient declined wanting any other STD testing today.  Additionally we discussed if symptoms do not improve after starting today's treatment to follow up in 7-10 days, sooner if symptoms worsen, return precautions given  No alarm signs or symptoms present   Differential Diagnoses for this patient's chief complaint that I considered include:  Balanitis, epididymitis, Yeast, UTI, Overactive bladder, urethritis, prostate pathology, Malignancy, nephrolithiasis, Atypical etiology of cystitis, STD      Patient is} agreeable to treatment plan and state they will follow-up if symptoms do not improve and/or if symptoms worsen   see patient's AVS 'monitor for' section for specific patient instructions given and discussed regarding what to watch for and when to follow up    KEILY [facility]  [facility]      ______________________________________________________________________      Subjective     HPI: Ruiz Son  is a 54 year old  male who presents today for evaluation the following concerns:   The patient presents today complaining of urinary frequency at night and slight dysuria  for about a week    Patient  denies} blood in urine or a  sensation of incomplete bladder emptying}    Patient endorses having a past history of no previous urinary tract infections, but did have a similar incidence of lower urinary tract symptoms about a year ago for which she saw urology, patient states at that time he was placed on detrol for overactive bladder   Patient denies back pain/flank pain, fever, chills, or any abnormal penile  discharge   Patient states he is not concerned about STDs and does not want any chlamydia or gonorrhea testing today    Review of Systems:  Pertinent review of systems as reflected in HPI, otherwise negative.     Objective    Physical Exam:  Vitals:    07/03/24 1719 07/03/24 1722   BP: (!) 132/94 (!) 142/97   Pulse: 77    Resp: 16    Temp: 98.1  F (36.7  C)    TempSrc: Tympanic    SpO2: 99%    Weight: 108.9 kg (240 lb)       General: Alert and oriented, no acute distress, afebrile, normotensive  Psy/mental status: Nonanxious, cooperative  SKIN: Intact, no open areas  ABDOMEN:  soft, non-tender, non-distended    No flank pain or CVA tenderness   Pelvic/ :   Deferred    LABS:   Results for orders placed or performed in visit on 07/03/24   UA Macroscopic with reflex to Microscopic and Culture - Clinic Collect     Status: Abnormal    Specimen: Urine, Clean Catch   Result Value Ref Range    Color Urine Yellow Colorless, Straw, Light Yellow, Yellow    Appearance Urine Clear Clear    Glucose Urine Negative Negative mg/dL    Bilirubin Urine Negative Negative    Ketones Urine Negative Negative mg/dL    Specific Gravity Urine 1.025 1.003 - 1.035    Blood Urine Negative Negative    pH Urine 6.0  5.0 - 7.0    Protein Albumin Urine Negative Negative mg/dL    Urobilinogen Urine 0.2 0.2, 1.0 E.U./dL    Nitrite Urine Negative Negative    Leukocyte Esterase Urine Small (A) Negative   UA Microscopic with Reflex to Culture     Status: Abnormal   Result Value Ref Range    Bacteria Urine Few (A) None Seen /HPF    RBC Urine 0-2 0-2 /HPF /HPF    WBC Urine 5-10 (A) 0-5 /HPF /HPF    Squamous Epithelials Urine Few (A) None Seen /LPF    Mucus Urine Present (A) None Seen /LPF    Narrative    Urine Culture not indicated        ______________________________________________________________________    I explained my diagnostic considerations and recommendations to the patient, who voiced understanding and agreement with the treatment plan.   All questions were answered.   We discussed potential side effects, risks and benefits of any prescribed or recommended therapies, as well as expectations for response to treatments.  Please see AVS for any patient instructions & handouts given.   Patient was advised to contact the Nurse Care Line, their Primary Care provider, Urgent Care, or the Emergency Department if there are new or worsening symptoms, or call 911 for emergencies.

## 2024-07-03 NOTE — PATIENT INSTRUCTIONS
Diagnosis: urinary frequency    Today we did:  Urine analysis was not consistent with a UTI, but will still send for culture   Will get one more urine test for a different bacteria     -if second test is negative then will follow up with urology    Possibly a prostate issue

## 2024-07-04 NOTE — TELEPHONE ENCOUNTER
Had UA, thought he had a UTI.  took two samples. Laquita says they're abnormal. She said he didn't have UTI. He said he got a message about abnormal results. He was seen at [facility] today. I told him they will be in touch if any treatment is needed. He is fine with that.  The urine culture isn't back yet.  Janet Rai RN  [facility]    Additional Information   Negative: [1] Caller is not with the adult (patient) AND [2] reporting urgent symptoms   Negative: Lab result questions   Negative: Medication questions   Negative: Caller can't be reached by phone   Negative: Caller has already spoken to PCP or another triager   Negative: RN needs further essential information from caller in order to complete triage   Negative: Requesting regular office appointment   Negative: [1] Caller requesting NON-URGENT health information AND [2] PCP's office is the best resource   [1] Follow-up call to recent contact AND [2] information only call, no triage required   Negative: Question about upcoming scheduled test, no triage required and triager able to answer question   Negative: [1] Caller is not with the adult (patient) AND [2] probable NON-URGENT symptoms   Negative: General information question, no triage required and triager able to answer question   Negative: Health Information question, no triage required and triager able to answer question    Protocols used: Information Only Call-A-

## 2024-07-05 LAB — BACTERIA UR CULT: NORMAL

## 2024-07-07 LAB
M GENITALIUM DNA SPEC QL NAA+PROBE: NOT DETECTED
M HOMINIS DNA SPEC QL NAA+PROBE: NOT DETECTED
U PARVUM DNA SPEC QL NAA+PROBE: NOT DETECTED
U UREALYTICUM DNA SPEC QL NAA+PROBE: NOT DETECTED

## 2024-07-18 ENCOUNTER — OFFICE VISIT (OUTPATIENT)
Dept: UROLOGY | Facility: CLINIC | Age: 54
End: 2024-07-18
Payer: COMMERCIAL

## 2024-07-18 VITALS
WEIGHT: 240 LBS | DIASTOLIC BLOOD PRESSURE: 95 MMHG | TEMPERATURE: 97.4 F | HEIGHT: 73 IN | OXYGEN SATURATION: 98 % | HEART RATE: 75 BPM | SYSTOLIC BLOOD PRESSURE: 150 MMHG | BODY MASS INDEX: 31.81 KG/M2

## 2024-07-18 DIAGNOSIS — N39.44 URINARY INCONTINENCE, NOCTURNAL ENURESIS: ICD-10-CM

## 2024-07-18 DIAGNOSIS — R35.0 FREQUENCY OF URINATION: ICD-10-CM

## 2024-07-18 PROCEDURE — 51798 US URINE CAPACITY MEASURE: CPT | Performed by: STUDENT IN AN ORGANIZED HEALTH CARE EDUCATION/TRAINING PROGRAM

## 2024-07-18 PROCEDURE — 99214 OFFICE O/P EST MOD 30 MIN: CPT | Mod: 25 | Performed by: STUDENT IN AN ORGANIZED HEALTH CARE EDUCATION/TRAINING PROGRAM

## 2024-07-18 RX ORDER — TOLTERODINE 4 MG/1
4 CAPSULE, EXTENDED RELEASE ORAL EVERY EVENING
Qty: 90 CAPSULE | Refills: 1 | Status: SHIPPED | OUTPATIENT
Start: 2024-07-18

## 2024-07-18 ASSESSMENT — PAIN SCALES - GENERAL: PAINLEVEL: MILD PAIN (2)

## 2024-07-18 NOTE — PROGRESS NOTES
"    UROLOGY FOLLOW-UP NOTE          Chief Complaint:   Today I had the pleasure of seeing Mr. Ruiz Son in follow-up for a chief complaint of LUTS.          Interval Update   Ruiz Son is a very pleasant 54 year old male with a history of HLD and HTN.     Brief History: Mr. Ruiz Son has followed with urology for nocturia with enuresis. He was started on tolterodine ER 2 mg and stopped drinking fluids after 6 pm, which was helpful.     Today's notes: He reports worsening symptoms including very mild pain prior to urination and nocturia x 2 with enuresis. He reports he will leak urine when he is in a deep sleep. He takes an OTC sleep aide. He reports daytime frequency. He denies daytime urgency and incontinence. He denies a weak stream, dysuria, and gross hematuria.     He was told he snores in the past.         Physical Exam:   Patient is a 54 year old  male   Vitals: Blood pressure (!) 150/95, pulse 75, temperature 97.4  F (36.3  C), temperature source Tympanic, height 1.854 m (6' 0.99\"), weight 108.9 kg (240 lb), SpO2 98%.  General: Alert and oriented x 3, no acute distress.  Respiratory: Non-labored breathing.  Cardiac: Regular rate.    PVR: 33 mL        Labs and Pathology:    I personally reviewed all applicable laboratory data and went over findings with patient  Significant for:    CBC RESULTS:  Recent Labs   Lab Test 05/19/22  1649 03/22/17  1149 03/16/17  1427 06/15/16  1553   WBC 6.8 8.4 9.2 6.9   HGB 14.9 13.4 13.4 14.6    381 237 254        BMP RESULTS:  Recent Labs   Lab Test 05/19/22  1649 03/22/17  1149 06/15/16  1553    136 139   POTASSIUM 4.3 3.2* 3.7   CHLORIDE 107 100 106   CO2 28 28 27   ANIONGAP 6 8 6   GLC 94 83 87   BUN 17 13 16   CR 1.28* 1.03 0.98   GFRESTIMATED 67 77 82   GFRESTBLACK  --  >90   GFR Calc   >90   GFR Calc     ANTHONY 8.6 8.9 8.7       UA RESULTS:   Recent Labs   Lab Test 07/03/24  1707 05/19/22  1649 06/15/16  1559 "   SG 1.025 1.020 1.025   URINEPH 6.0 7.0 5.5   NITRITE Negative Negative Negative   RBCU 0-2  --  O - 2   WBCU 5-10*  --  O - 2       PSA RESULTS  PSA   Date Value Ref Range Status   06/15/2016 0.46 0 - 4 ug/L Final            Assessment/Plan   54 year old male seen in follow up for nocturia with enuresis. He was started on tolterodine ER 2 mg and stopped drinking fluids after 6 pm, which was helpful.     He reports worsening symptoms including very mild abdominal pain prior to urination and nocturia x 2 with enuresis. He reports he will leak urine when he is in a deep sleep. He takes an OTC sleep aide. He reports daytime frequency. He denies daytime urgency and incontinence. He denies a weak stream, dysuria, and gross hematuria.     He was told he snores in the past.    We discussed strategies moving forward. I would like him to try not taking his sleep aid for a week or so to see if that improves nocturnal enuresis. I wonder if he is in a deep sleep and is not getting signals to wake up to urinate. We also discussed increasing his tolterodine dose. Will plan to follow up in a few months. If these strategies are not helpful, could consider an evaluation to sleep medicine for possible sleep apnea evaluation.     Plan:  Trial without OTC sleep aid for 1-2 weeks.   Increase tolterodine dose to 4 mg every evening.   Follow up in 2-3 months, sooner if concerns.            Past Medical History:   No past medical history on file.         Past Surgical History:     Past Surgical History:   Procedure Laterality Date    SURGICAL HISTORY OF -   age 15    RIGHT varicocelectomy            Medications     Current Outpatient Medications   Medication Sig Dispense Refill    FLUoxetine (PROZAC) 20 MG capsule TAKE 1 CAPSULE(20 MG) BY MOUTH DAILY 30 capsule 0    metoprolol succinate ER (TOPROL XL) 50 MG 24 hr tablet TAKE 1 TABLET(50 MG) BY MOUTH DAILY 90 tablet 0    mirtazapine (REMERON) 15 MG tablet TAKE 1 TABLET BY MOUTH AT BEDTIME  30 tablet 0    tolterodine ER (DETROL LA) 2 MG 24 hr capsule TAKE 1 CAPSULE(2 MG) BY MOUTH DAILY 30 capsule 1     No current facility-administered medications for this visit.            Family History:     Family History   Problem Relation Age of Onset    Hypertension Mother     Cancer Father         esophageal    Hypertension Brother     Prostate Cancer Other         paternal uncle    Cancer - colorectal No family hx of     C.A.D. No family hx of             Social History:     Social History     Socioeconomic History    Marital status:      Spouse name: Not on file    Number of children: Not on file    Years of education: Not on file    Highest education level: Not on file   Occupational History    Not on file   Tobacco Use    Smoking status: Never    Smokeless tobacco: Never   Vaping Use    Vaping status: Never Used   Substance and Sexual Activity    Alcohol use: Yes     Alcohol/week: 0.0 standard drinks of alcohol     Comment: very little/   1-2 xmonth    Drug use: No    Sexual activity: Yes   Other Topics Concern    Parent/sibling w/ CABG, MI or angioplasty before 65F 55M? No   Social History Narrative    Not on file     Social Determinants of Health     Financial Resource Strain: Not on file   Food Insecurity: Not on file   Transportation Needs: Not on file   Physical Activity: Not on file   Stress: Not on file   Social Connections: Not on file   Interpersonal Safety: Not on file   Housing Stability: Not on file            Allergies:   Patient has no known allergies.         Review of Systems:  From intake questionnaire   Negative 14 system review except as noted on HPI, nurse's note.        BARBARA KING PA-C  Department of Urology

## 2024-07-18 NOTE — NURSING NOTE
Active order to obtain bladder scan? Yes   Name of ordering provider:  Mckenna Whipple  Bladder scan preformed post void yes.  Bladder scan reveled 33ML  Provider notified?  Yes    Tressa VALENCIA CMA

## 2024-07-18 NOTE — NURSING NOTE
"Initial BP (!) 150/95   Pulse 75   Temp 97.4  F (36.3  C) (Tympanic)   Ht 1.854 m (6' 0.99\")   Wt 108.9 kg (240 lb)   SpO2 98%   BMI 31.67 kg/m   Estimated body mass index is 31.67 kg/m  as calculated from the following:    Height as of this encounter: 1.854 m (6' 0.99\").    Weight as of this encounter: 108.9 kg (240 lb). .  Tressa VALENCIA CMA 07/18/24 7:50 AM  "

## 2024-07-31 ENCOUNTER — OFFICE VISIT (OUTPATIENT)
Dept: FAMILY MEDICINE | Facility: CLINIC | Age: 54
End: 2024-07-31
Payer: COMMERCIAL

## 2024-07-31 VITALS
BODY MASS INDEX: 32.2 KG/M2 | HEIGHT: 73 IN | SYSTOLIC BLOOD PRESSURE: 138 MMHG | DIASTOLIC BLOOD PRESSURE: 88 MMHG | HEART RATE: 100 BPM | RESPIRATION RATE: 16 BRPM | TEMPERATURE: 98.6 F | OXYGEN SATURATION: 99 % | WEIGHT: 243 LBS

## 2024-07-31 DIAGNOSIS — F32.0 MILD MAJOR DEPRESSION (H): ICD-10-CM

## 2024-07-31 DIAGNOSIS — I10 ESSENTIAL HYPERTENSION: Primary | ICD-10-CM

## 2024-07-31 DIAGNOSIS — Z12.5 SCREENING FOR PROSTATE CANCER: ICD-10-CM

## 2024-07-31 DIAGNOSIS — F41.1 GENERALIZED ANXIETY DISORDER: ICD-10-CM

## 2024-07-31 LAB
ANION GAP SERPL CALCULATED.3IONS-SCNC: 12 MMOL/L (ref 7–15)
BUN SERPL-MCNC: 14.5 MG/DL (ref 6–20)
CALCIUM SERPL-MCNC: 9.5 MG/DL (ref 8.8–10.4)
CHLORIDE SERPL-SCNC: 109 MMOL/L (ref 98–107)
CREAT SERPL-MCNC: 1.08 MG/DL (ref 0.67–1.17)
EGFRCR SERPLBLD CKD-EPI 2021: 81 ML/MIN/1.73M2
GLUCOSE SERPL-MCNC: 83 MG/DL (ref 70–99)
HCO3 SERPL-SCNC: 20 MMOL/L (ref 22–29)
POTASSIUM SERPL-SCNC: 4.2 MMOL/L (ref 3.4–5.3)
SODIUM SERPL-SCNC: 141 MMOL/L (ref 135–145)

## 2024-07-31 PROCEDURE — 99214 OFFICE O/P EST MOD 30 MIN: CPT | Performed by: FAMILY MEDICINE

## 2024-07-31 PROCEDURE — 80048 BASIC METABOLIC PNL TOTAL CA: CPT | Performed by: FAMILY MEDICINE

## 2024-07-31 PROCEDURE — G2211 COMPLEX E/M VISIT ADD ON: HCPCS | Performed by: FAMILY MEDICINE

## 2024-07-31 PROCEDURE — 36415 COLL VENOUS BLD VENIPUNCTURE: CPT | Performed by: FAMILY MEDICINE

## 2024-07-31 RX ORDER — MIRTAZAPINE 15 MG/1
TABLET, FILM COATED ORAL
Qty: 90 TABLET | Refills: 3 | Status: SHIPPED | OUTPATIENT
Start: 2024-07-31

## 2024-07-31 RX ORDER — METOPROLOL SUCCINATE 100 MG/1
100 TABLET, EXTENDED RELEASE ORAL DAILY
Qty: 90 TABLET | Refills: 3 | Status: SHIPPED | OUTPATIENT
Start: 2024-07-31

## 2024-07-31 ASSESSMENT — PATIENT HEALTH QUESTIONNAIRE - PHQ9
SUM OF ALL RESPONSES TO PHQ QUESTIONS 1-9: 0
SUM OF ALL RESPONSES TO PHQ QUESTIONS 1-9: 0
10. IF YOU CHECKED OFF ANY PROBLEMS, HOW DIFFICULT HAVE THESE PROBLEMS MADE IT FOR YOU TO DO YOUR WORK, TAKE CARE OF THINGS AT HOME, OR GET ALONG WITH OTHER PEOPLE: NOT DIFFICULT AT ALL

## 2024-07-31 ASSESSMENT — ANXIETY QUESTIONNAIRES
6. BECOMING EASILY ANNOYED OR IRRITABLE: SEVERAL DAYS
7. FEELING AFRAID AS IF SOMETHING AWFUL MIGHT HAPPEN: NOT AT ALL
3. WORRYING TOO MUCH ABOUT DIFFERENT THINGS: SEVERAL DAYS
GAD7 TOTAL SCORE: 3
5. BEING SO RESTLESS THAT IT IS HARD TO SIT STILL: NOT AT ALL
IF YOU CHECKED OFF ANY PROBLEMS ON THIS QUESTIONNAIRE, HOW DIFFICULT HAVE THESE PROBLEMS MADE IT FOR YOU TO DO YOUR WORK, TAKE CARE OF THINGS AT HOME, OR GET ALONG WITH OTHER PEOPLE: NOT DIFFICULT AT ALL
1. FEELING NERVOUS, ANXIOUS, OR ON EDGE: NOT AT ALL
7. FEELING AFRAID AS IF SOMETHING AWFUL MIGHT HAPPEN: NOT AT ALL
GAD7 TOTAL SCORE: 3
GAD7 TOTAL SCORE: 3
2. NOT BEING ABLE TO STOP OR CONTROL WORRYING: SEVERAL DAYS
4. TROUBLE RELAXING: NOT AT ALL
8. IF YOU CHECKED OFF ANY PROBLEMS, HOW DIFFICULT HAVE THESE MADE IT FOR YOU TO DO YOUR WORK, TAKE CARE OF THINGS AT HOME, OR GET ALONG WITH OTHER PEOPLE?: NOT DIFFICULT AT ALL

## 2024-07-31 ASSESSMENT — PAIN SCALES - GENERAL: PAINLEVEL: NO PAIN (0)

## 2024-07-31 NOTE — PROGRESS NOTES
"S: Ruiz Son is a 54 year old male with depression.  Anxiety.  Better on prozac and remeron, due for fills    Htn: borderline, pulse borderline.  Willing to go up to 100mg on toprol xl     Current Outpatient Medications   Medication Sig Dispense Refill    FLUoxetine (PROZAC) 20 MG capsule Take 1 capsule (20 mg) by mouth daily 90 capsule 3    metoprolol succinate ER (TOPROL XL) 100 MG 24 hr tablet Take 1 tablet (100 mg) by mouth daily 90 tablet 3    mirtazapine (REMERON) 15 MG tablet TAKE 1 TABLET BY MOUTH AT  BEDTIME 90 tablet 3    tolterodine ER (DETROL LA) 4 MG 24 hr capsule Take 1 capsule (4 mg) by mouth every evening 90 capsule 1     O:/88   Pulse 100   Temp 98.6  F (37  C) (Tympanic)   Resp 16   Ht 1.854 m (6' 0.99\")   Wt 110.2 kg (243 lb)   SpO2 99%   BMI 32.07 kg/m    GEN: Alert and oriented, in no acute distress  CV: RRR, no murmur  RESP: lungs clear bilaterally, good effort    A; htn, borderline      Anxiety, stable      Depression, stable    P: up to 100mg on toprol.  Continue other meds.  Fills on them as well    Update met panel, last Cr was up some a couple years ago    Discussed risks and benefits of PSA screening with the patient, attempting to engage in shared decision making.  Discussed the high likelihood of further invasive evaluation if the PSA is elevated.  Discussed inability to reliably distinguish aggressive from indolent cancer on biopsy. Explained that PSA screeing and aggressive treatment for biopsy confirmed cancer may extend life in those patients harboring an aggressive form of prostate cancer, but will overtreat the majority of men because of the high rate of indolent cancer.  We discussed that treatment of prostate cancer is not benign, and that significant side effects are a real possibility.  We discussed current USPSTF recommendations against routine screening for prostate cancer, advising instead to discuss the harms and benefits to make an informed decision.    "              Given the above information, the patient elected not to procede with PSA screeing at this time.  He understands he can revisit the discussion at any time.   e   5-Fu Counseling: 5-Fluorouracil Counseling:  I discussed with the patient the risks of 5-fluorouracil including but not limited to erythema, scaling, itching, weeping, crusting, and pain.

## 2024-08-25 ENCOUNTER — HEALTH MAINTENANCE LETTER (OUTPATIENT)
Age: 54
End: 2024-08-25

## 2024-08-27 DIAGNOSIS — N39.44 URINARY INCONTINENCE, NOCTURNAL ENURESIS: ICD-10-CM

## 2024-08-27 RX ORDER — TOLTERODINE 2 MG/1
CAPSULE, EXTENDED RELEASE ORAL
Qty: 30 CAPSULE | Refills: 1 | OUTPATIENT
Start: 2024-08-27

## 2024-11-03 ASSESSMENT — PATIENT HEALTH QUESTIONNAIRE - PHQ9: SUM OF ALL RESPONSES TO PHQ QUESTIONS 1-9: 7

## 2025-07-08 DIAGNOSIS — I10 ESSENTIAL HYPERTENSION: ICD-10-CM

## 2025-07-08 DIAGNOSIS — F41.1 GENERALIZED ANXIETY DISORDER: ICD-10-CM

## 2025-07-08 DIAGNOSIS — F32.0 MILD MAJOR DEPRESSION: ICD-10-CM

## 2025-07-08 RX ORDER — MIRTAZAPINE 15 MG/1
15 TABLET, FILM COATED ORAL AT BEDTIME
Qty: 30 TABLET | Refills: 0 | Status: SHIPPED | OUTPATIENT
Start: 2025-07-08

## 2025-07-08 RX ORDER — METOPROLOL SUCCINATE 100 MG/1
100 TABLET, EXTENDED RELEASE ORAL DAILY
Qty: 30 TABLET | Refills: 0 | Status: SHIPPED | OUTPATIENT
Start: 2025-07-08

## 2025-08-11 DIAGNOSIS — I10 ESSENTIAL HYPERTENSION: ICD-10-CM

## 2025-08-11 DIAGNOSIS — F41.1 GENERALIZED ANXIETY DISORDER: ICD-10-CM

## 2025-08-11 DIAGNOSIS — F32.0 MILD MAJOR DEPRESSION: ICD-10-CM

## 2025-08-11 RX ORDER — METOPROLOL SUCCINATE 100 MG/1
100 TABLET, EXTENDED RELEASE ORAL DAILY
Qty: 30 TABLET | Refills: 0 | Status: SHIPPED | OUTPATIENT
Start: 2025-08-11

## 2025-08-11 RX ORDER — MIRTAZAPINE 15 MG/1
15 TABLET, FILM COATED ORAL AT BEDTIME
Qty: 30 TABLET | Refills: 0 | Status: SHIPPED | OUTPATIENT
Start: 2025-08-11

## (undated) RX ORDER — METOPROLOL TARTRATE 1 MG/ML
INJECTION, SOLUTION INTRAVENOUS
Status: DISPENSED
Start: 2021-08-09